# Patient Record
Sex: FEMALE | Race: WHITE | Employment: OTHER | URBAN - METROPOLITAN AREA
[De-identification: names, ages, dates, MRNs, and addresses within clinical notes are randomized per-mention and may not be internally consistent; named-entity substitution may affect disease eponyms.]

---

## 2017-03-27 ENCOUNTER — LAB ENCOUNTER (OUTPATIENT)
Dept: LAB | Age: 47
End: 2017-03-27
Attending: Other
Payer: MEDICARE

## 2017-03-27 DIAGNOSIS — F32.A DEPRESSION: Primary | ICD-10-CM

## 2017-03-27 LAB
ANION GAP SERPL CALC-SCNC: 9 MMOL/L (ref 0–18)
BUN SERPL-MCNC: 4 MG/DL (ref 8–20)
BUN/CREAT SERPL: 5.8 (ref 10–20)
CALCIUM SERPL-MCNC: 9.3 MG/DL (ref 8.5–10.5)
CHLORIDE SERPL-SCNC: 102 MMOL/L (ref 95–110)
CO2 SERPL-SCNC: 26 MMOL/L (ref 22–32)
CREAT SERPL-MCNC: 0.69 MG/DL (ref 0.5–1.5)
GLUCOSE SERPL-MCNC: 98 MG/DL (ref 70–99)
LITHIUM SERPL-SCNC: 0.3 MEQ/L (ref 0.5–1.5)
OSMOLALITY UR CALC.SUM OF ELEC: 281 MOSM/KG (ref 275–295)
POTASSIUM SERPL-SCNC: 3.9 MMOL/L (ref 3.3–5.1)
SODIUM SERPL-SCNC: 137 MMOL/L (ref 136–144)
TSH SERPL-ACNC: 2.89 UIU/ML (ref 0.34–5.6)

## 2017-03-27 PROCEDURE — 84443 ASSAY THYROID STIM HORMONE: CPT

## 2017-03-27 PROCEDURE — 80178 ASSAY OF LITHIUM: CPT

## 2017-03-27 PROCEDURE — 36415 COLL VENOUS BLD VENIPUNCTURE: CPT

## 2017-03-27 PROCEDURE — 80048 BASIC METABOLIC PNL TOTAL CA: CPT

## 2017-08-23 ENCOUNTER — HOSPITAL ENCOUNTER (EMERGENCY)
Facility: HOSPITAL | Age: 47
Discharge: HOME | End: 2017-08-23
Payer: MEDICARE

## 2017-08-23 ENCOUNTER — APPOINTMENT (OUTPATIENT)
Dept: GENERAL RADIOLOGY | Facility: HOSPITAL | Age: 47
End: 2017-08-23
Payer: MEDICARE

## 2017-08-23 VITALS
HEIGHT: 60 IN | WEIGHT: 185 LBS | HEART RATE: 83 BPM | SYSTOLIC BLOOD PRESSURE: 112 MMHG | OXYGEN SATURATION: 97 % | TEMPERATURE: 98 F | RESPIRATION RATE: 19 BRPM | DIASTOLIC BLOOD PRESSURE: 65 MMHG | BODY MASS INDEX: 36.32 KG/M2

## 2017-08-23 DIAGNOSIS — T07.XXXA MULTIPLE CONTUSIONS: Primary | ICD-10-CM

## 2017-08-23 PROCEDURE — 73610 X-RAY EXAM OF ANKLE: CPT

## 2017-08-23 PROCEDURE — 73030 X-RAY EXAM OF SHOULDER: CPT

## 2017-08-23 PROCEDURE — 99284 EMERGENCY DEPT VISIT MOD MDM: CPT

## 2017-08-23 PROCEDURE — 73660 X-RAY EXAM OF TOE(S): CPT

## 2017-08-23 NOTE — ED PROVIDER NOTES
Patient Seen in: [facility] Emergency Department    History   Patient presents with:  Fall (musculoskeletal, neurologic)    Stated Complaint: Fall yesterday, pain in both feet    HPI    Patient is a 70-year-old female who states yesterday she was Cyclobenzaprine HCl (CYCLOBENZAPRINE) 10 MG Oral Tab,         Family History   Problem Relation Age of Onset   • Hypertension Mother    • Prostate Cancer Father    • Uterine Cancer Mother        Smoking status: Current Every Day Smoker tenderness there is good range of motion. Distal circulation sensorimotor functions intact. There is a superficial abrasion to the left knee. There is no bony tenderness there is good range of motion there is no ligamentous laxity.   There is minimal [address]  [address]  [facility] [address]  [phone]    Schedule an appointment as soon as possible for a visit in 2 days        Medications Prescribed:  Current Discharge Medication List

## 2017-08-23 NOTE — ED NOTES
Discharge instructions given to pt. Pt verbalized understanding and denied further questions or concerns. Pt discharged to lobby via wheelchair with mother, discharged in stable condition.

## 2017-08-23 NOTE — ED INITIAL ASSESSMENT (HPI)
C/o left shoulder pain, left ankle pain, and right toe pain s/p fall yesterday. Denies head injury or LOC.

## 2019-01-17 ENCOUNTER — LAB ENCOUNTER (OUTPATIENT)
Dept: LAB | Age: 49
End: 2019-01-17
Attending: Other
Payer: MEDICARE

## 2019-01-17 DIAGNOSIS — Z79.899 ENCOUNTER FOR LONG-TERM (CURRENT) USE OF OTHER MEDICATIONS: Primary | ICD-10-CM

## 2019-01-17 LAB
ANION GAP SERPL CALC-SCNC: 9 MMOL/L (ref 0–18)
BASOPHILS # BLD: 0 K/UL (ref 0–0.2)
BASOPHILS NFR BLD: 1 %
BUN SERPL-MCNC: 6 MG/DL (ref 8–20)
BUN/CREAT SERPL: 6.7 (ref 10–20)
CALCIUM SERPL-MCNC: 9.2 MG/DL (ref 8.5–10.5)
CHLORIDE SERPL-SCNC: 103 MMOL/L (ref 95–110)
CO2 SERPL-SCNC: 24 MMOL/L (ref 22–32)
CREAT SERPL-MCNC: 0.89 MG/DL (ref 0.5–1.5)
EOSINOPHIL # BLD: 0.3 K/UL (ref 0–0.7)
EOSINOPHIL NFR BLD: 4 %
ERYTHROCYTE [DISTWIDTH] IN BLOOD BY AUTOMATED COUNT: 14 % (ref 11–15)
GLUCOSE SERPL-MCNC: 132 MG/DL (ref 70–99)
HCT VFR BLD AUTO: 40.7 % (ref 35–48)
HGB BLD-MCNC: 13.1 G/DL (ref 12–16)
LITHIUM SERPL-SCNC: 0.9 MEQ/L (ref 0.5–1.5)
LYMPHOCYTES # BLD: 1.5 K/UL (ref 1–4)
LYMPHOCYTES NFR BLD: 19 %
MCH RBC QN AUTO: 29 PG (ref 27–32)
MCHC RBC AUTO-ENTMCNC: 32.2 G/DL (ref 32–37)
MCV RBC AUTO: 90.1 FL (ref 80–100)
MONOCYTES # BLD: 0.4 K/UL (ref 0–1)
MONOCYTES NFR BLD: 4 %
NEUTROPHILS # BLD AUTO: 5.8 K/UL (ref 1.8–7.7)
NEUTROPHILS NFR BLD: 72 %
OSMOLALITY UR CALC.SUM OF ELEC: 281 MOSM/KG (ref 275–295)
PLATELET # BLD AUTO: 441 K/UL (ref 140–400)
PMV BLD AUTO: 6.9 FL (ref 7.4–10.3)
POTASSIUM SERPL-SCNC: 3.4 MMOL/L (ref 3.3–5.1)
RBC # BLD AUTO: 4.51 M/UL (ref 3.7–5.4)
SODIUM SERPL-SCNC: 136 MMOL/L (ref 136–144)
TSH SERPL-ACNC: 3.18 UIU/ML (ref 0.45–5.33)
WBC # BLD AUTO: 8.1 K/UL (ref 4–11)

## 2019-01-17 PROCEDURE — 80048 BASIC METABOLIC PNL TOTAL CA: CPT

## 2019-01-17 PROCEDURE — 84443 ASSAY THYROID STIM HORMONE: CPT

## 2019-01-17 PROCEDURE — 80178 ASSAY OF LITHIUM: CPT

## 2019-01-17 PROCEDURE — 85025 COMPLETE CBC W/AUTO DIFF WBC: CPT

## 2019-01-17 PROCEDURE — 36415 COLL VENOUS BLD VENIPUNCTURE: CPT

## 2020-10-08 ENCOUNTER — OFFICE VISIT (OUTPATIENT)
Dept: OBGYN CLINIC | Facility: CLINIC | Age: 50
End: 2020-10-08
Payer: MEDICARE

## 2020-10-08 VITALS
HEART RATE: 99 BPM | SYSTOLIC BLOOD PRESSURE: 118 MMHG | BODY MASS INDEX: 33.77 KG/M2 | DIASTOLIC BLOOD PRESSURE: 76 MMHG | HEIGHT: 60 IN | WEIGHT: 172 LBS

## 2020-10-08 DIAGNOSIS — N95.0 PMB (POSTMENOPAUSAL BLEEDING): ICD-10-CM

## 2020-10-08 DIAGNOSIS — Z12.31 SCREENING MAMMOGRAM, ENCOUNTER FOR: ICD-10-CM

## 2020-10-08 DIAGNOSIS — Z12.4 SCREENING FOR MALIGNANT NEOPLASM OF CERVIX: ICD-10-CM

## 2020-10-08 DIAGNOSIS — Z01.419 WELL WOMAN EXAM: Primary | ICD-10-CM

## 2020-10-08 PROCEDURE — 3074F SYST BP LT 130 MM HG: CPT | Performed by: OBSTETRICS & GYNECOLOGY

## 2020-10-08 PROCEDURE — 3008F BODY MASS INDEX DOCD: CPT | Performed by: OBSTETRICS & GYNECOLOGY

## 2020-10-08 PROCEDURE — 99386 PREV VISIT NEW AGE 40-64: CPT | Performed by: OBSTETRICS & GYNECOLOGY

## 2020-10-08 PROCEDURE — 3078F DIAST BP <80 MM HG: CPT | Performed by: OBSTETRICS & GYNECOLOGY

## 2020-10-08 RX ORDER — DULOXETIN HYDROCHLORIDE 60 MG/1
60 CAPSULE, DELAYED RELEASE ORAL DAILY
COMMUNITY

## 2020-10-08 NOTE — H&P
HPI:  The patient is a 51 yo F here for WWE. Pt states she didn't have menses for 2 years, and then this year, has had 4 episodes of VB for 3 days at a time. Previous GYN Dr. Laxmi Martinez but didn't mention this to him.       LPS: 8/21/15 pap/hpv  Mammo: 10/16/15 drinks      Drug use: No      Sexual activity: Yes    Lifestyle      Physical activity        Days per week: Not on file        Minutes per session: Not on file      Stress: Not on file    Relationships      Social connections        Talks on phone: Not on Transdermal Patch 72 Hr, , Disp: , Rfl: 0  •  Cyclobenzaprine HCl (CYCLOBENZAPRINE) 10 MG Oral Tab, , Disp: , Rfl: 0    ALLERGIES:  No Known Allergies      Review of Systems:  Constitutional:  Denies fevers and chills   Cardiovascular:  denies chest pain o [facility] AMALIA 2D+3D SCREENING BILAT (CPT=77067/09263); Future    PMB (postmenopausal bleeding)  -     US PELVIS W EV (CPT=76856/01417); Future      1. WWE:   1. Reviewed ASCCP guidelines with the patient --pap/hpv today  2.  PMB: plan for TVUS and may need EMB

## 2020-11-09 ENCOUNTER — TELEPHONE (OUTPATIENT)
Dept: OBGYN CLINIC | Facility: CLINIC | Age: 50
End: 2020-11-09

## 2020-11-09 NOTE — TELEPHONE ENCOUNTER
Patient scheduled for pelvic ultrasound at [facility] on 11/10.     Need authorization for codes 76689 & 15017    Nahomi West said they will see it when completed and didn't need call back

## 2020-11-10 ENCOUNTER — HOSPITAL ENCOUNTER (OUTPATIENT)
Dept: MAMMOGRAPHY | Facility: HOSPITAL | Age: 50
Discharge: HOME | End: 2020-11-10
Attending: OBSTETRICS & GYNECOLOGY
Payer: MEDICARE

## 2020-11-10 ENCOUNTER — HOSPITAL ENCOUNTER (OUTPATIENT)
Dept: ULTRASOUND IMAGING | Facility: HOSPITAL | Age: 50
Discharge: HOME | End: 2020-11-10
Attending: OBSTETRICS & GYNECOLOGY
Payer: MEDICARE

## 2020-11-10 DIAGNOSIS — Z12.31 SCREENING MAMMOGRAM, ENCOUNTER FOR: ICD-10-CM

## 2020-11-10 DIAGNOSIS — N95.0 PMB (POSTMENOPAUSAL BLEEDING): ICD-10-CM

## 2020-11-10 PROCEDURE — 77067 SCR MAMMO BI INCL CAD: CPT | Performed by: OBSTETRICS & GYNECOLOGY

## 2020-11-10 PROCEDURE — 77063 BREAST TOMOSYNTHESIS BI: CPT | Performed by: OBSTETRICS & GYNECOLOGY

## 2020-11-10 PROCEDURE — 76830 TRANSVAGINAL US NON-OB: CPT | Performed by: OBSTETRICS & GYNECOLOGY

## 2020-11-10 PROCEDURE — 76856 US EXAM PELVIC COMPLETE: CPT | Performed by: OBSTETRICS & GYNECOLOGY

## 2020-12-18 ENCOUNTER — TELEPHONE (OUTPATIENT)
Dept: OBGYN CLINIC | Facility: CLINIC | Age: 50
End: 2020-12-18

## 2021-01-04 ENCOUNTER — PATIENT MESSAGE (OUTPATIENT)
Dept: OBGYN CLINIC | Facility: CLINIC | Age: 51
End: 2021-01-04

## 2021-01-04 ENCOUNTER — TELEPHONE (OUTPATIENT)
Dept: OBGYN CLINIC | Facility: CLINIC | Age: 51
End: 2021-01-04

## 2021-01-04 DIAGNOSIS — N95.0 PMB (POSTMENOPAUSAL BLEEDING): Primary | ICD-10-CM

## 2021-01-06 NOTE — TELEPHONE ENCOUNTER
Patient calling back and states to have DR. Rosas Speak call this number: [phone] to discuss the US results

## 2021-01-07 NOTE — TELEPHONE ENCOUNTER
Patients mother answered phone. Pt unavailable to speak at this time.   Asked her mother to have pt call me when she can

## 2021-01-07 NOTE — TELEPHONE ENCOUNTER
Patient is returning call and hoping to talk with Dr. Kirstin Hamlin today. She is available from 10am thru the evening. Please advise.

## 2021-01-11 NOTE — TELEPHONE ENCOUNTER
[employer] and spoke to J[address] Who stated stated NO PA needed for CPT 71432.  Call ref# [id number]

## 2021-01-11 NOTE — TELEPHONE ENCOUNTER
Called and spoke with patient. Reviewed thickened endometrium with 1.1cm lesoin in YESICA vs cervix. Reviewed endosee vs D&C. R/B reviewed  Pt wants Endosee with possible emb. Okay to schedule at any point given PMB.       Schedule endosee for this patient:

## 2021-01-12 ENCOUNTER — TELEPHONE (OUTPATIENT)
Dept: OBGYN CLINIC | Facility: CLINIC | Age: 51
End: 2021-01-12

## 2021-01-12 NOTE — TELEPHONE ENCOUNTER
Pt is calling spoke to  yesterday ,she has a tear on uterus .  Pt would like procedure to be done at hospital and have everything done at once ,  She is waiting on nurse to call her ,

## 2021-01-13 NOTE — TELEPHONE ENCOUNTER
Called pt to schedule Endosee. Pt stated that she spoke with [address] [place] [address] and was given 3 options on how to proceed. Pt stated that [address] [place] [address] discussed \"going under\" and pt stated she would prefer to go that route now instead of endosee in office.  Message to [address] [place] [address] for batista

## 2021-01-18 NOTE — TELEPHONE ENCOUNTER
See pt.  Message 01/04 will call pt tomorrow when able to call insurance due to holidays insurance is closed

## 2021-01-18 NOTE — TELEPHONE ENCOUNTER
OB GYN SURGICAL SCHEDULING    Diagnosis: PMB    Operative Procedure:  D&C, Hysteroscopy, Myosure     Side: n/a    Date: okay while on call    Admission:  Day Surgery    Anesthesia:  General     Bowel Prep:  No    Cytotec (200 mcg night prior):   Yes    Rep

## 2021-01-26 NOTE — TELEPHONE ENCOUNTER
Spoke to pt. aware surgery is scheduled on Thursday,02/11/2021 at 3pm with possibility to move up to 1pm. Will call with any changes    Provided her with Cytotec instructions     Called [employer] at [phone] and spoke to Yuli Valladares stat

## 2021-02-04 NOTE — TELEPHONE ENCOUNTER
Called pt and LM advising her surgery has been moved from 3pm to 1pm.    Updated instructions sent     Updated book and calender

## 2021-02-09 ENCOUNTER — LAB ENCOUNTER (OUTPATIENT)
Dept: LAB | Facility: HOSPITAL | Age: 51
End: 2021-02-09
Attending: OBSTETRICS & GYNECOLOGY
Payer: MEDICARE

## 2021-02-09 DIAGNOSIS — Z01.818 PREOP TESTING: ICD-10-CM

## 2021-02-10 LAB — SARS-COV-2 RNA RESP QL NAA+PROBE: NOT DETECTED

## 2021-02-10 RX ORDER — CLONAZEPAM 1 MG/1
1 TABLET, ORALLY DISINTEGRATING ORAL 2 TIMES DAILY PRN
COMMUNITY

## 2021-02-10 RX ORDER — OXYCODONE AND ACETAMINOPHEN 10; 325 MG/1; MG/1
2 TABLET ORAL 2 TIMES DAILY
COMMUNITY

## 2021-02-11 ENCOUNTER — ANESTHESIA (OUTPATIENT)
Dept: SURGERY | Facility: HOSPITAL | Age: 51
End: 2021-02-11
Payer: MEDICARE

## 2021-02-11 ENCOUNTER — HOSPITAL ENCOUNTER (OUTPATIENT)
Facility: HOSPITAL | Age: 51
Setting detail: HOSPITAL OUTPATIENT SURGERY
Discharge: HOME | End: 2021-02-11
Attending: OBSTETRICS & GYNECOLOGY | Admitting: OBSTETRICS & GYNECOLOGY
Payer: MEDICARE

## 2021-02-11 ENCOUNTER — ANESTHESIA EVENT (OUTPATIENT)
Dept: SURGERY | Facility: HOSPITAL | Age: 51
End: 2021-02-11
Payer: MEDICARE

## 2021-02-11 VITALS
RESPIRATION RATE: 16 BRPM | DIASTOLIC BLOOD PRESSURE: 61 MMHG | HEIGHT: 60 IN | OXYGEN SATURATION: 96 % | BODY MASS INDEX: 33.96 KG/M2 | TEMPERATURE: 98 F | HEART RATE: 80 BPM | SYSTOLIC BLOOD PRESSURE: 108 MMHG | WEIGHT: 173 LBS

## 2021-02-11 DIAGNOSIS — N95.0 PMB (POSTMENOPAUSAL BLEEDING): ICD-10-CM

## 2021-02-11 DIAGNOSIS — Z01.818 PREOP TESTING: Primary | ICD-10-CM

## 2021-02-11 LAB — B-HCG UR QL: NEGATIVE

## 2021-02-11 PROCEDURE — 0UDB8ZX EXTRACTION OF ENDOMETRIUM, VIA NATURAL OR ARTIFICIAL OPENING ENDOSCOPIC, DIAGNOSTIC: ICD-10-PCS | Performed by: OBSTETRICS & GYNECOLOGY

## 2021-02-11 PROCEDURE — 58563 HYSTEROSCOPY ABLATION: CPT | Performed by: OBSTETRICS & GYNECOLOGY

## 2021-02-11 RX ORDER — SODIUM CHLORIDE, SODIUM LACTATE, POTASSIUM CHLORIDE, CALCIUM CHLORIDE 600; 310; 30; 20 MG/100ML; MG/100ML; MG/100ML; MG/100ML
INJECTION, SOLUTION INTRAVENOUS CONTINUOUS
Status: DISCONTINUED | OUTPATIENT
Start: 2021-02-11 | End: 2021-02-11

## 2021-02-11 RX ORDER — LITHIUM CARBONATE 300 MG/1
300 CAPSULE ORAL NIGHTLY
COMMUNITY

## 2021-02-11 RX ORDER — HALOPERIDOL 5 MG/ML
0.25 INJECTION INTRAMUSCULAR ONCE AS NEEDED
Status: DISCONTINUED | OUTPATIENT
Start: 2021-02-11 | End: 2021-02-11

## 2021-02-11 RX ORDER — HYDROCODONE BITARTRATE AND ACETAMINOPHEN 5; 325 MG/1; MG/1
1 TABLET ORAL AS NEEDED
Status: DISCONTINUED | OUTPATIENT
Start: 2021-02-11 | End: 2021-02-11

## 2021-02-11 RX ORDER — NALOXONE HYDROCHLORIDE 0.4 MG/ML
80 INJECTION, SOLUTION INTRAMUSCULAR; INTRAVENOUS; SUBCUTANEOUS AS NEEDED
Status: DISCONTINUED | OUTPATIENT
Start: 2021-02-11 | End: 2021-02-11

## 2021-02-11 RX ORDER — LIDOCAINE HYDROCHLORIDE 10 MG/ML
INJECTION, SOLUTION EPIDURAL; INFILTRATION; INTRACAUDAL; PERINEURAL AS NEEDED
Status: DISCONTINUED | OUTPATIENT
Start: 2021-02-11 | End: 2021-02-11 | Stop reason: SURG

## 2021-02-11 RX ORDER — ACETAMINOPHEN 500 MG
1000 TABLET ORAL ONCE
Status: COMPLETED | OUTPATIENT
Start: 2021-02-11 | End: 2021-02-11

## 2021-02-11 RX ORDER — ACETAMINOPHEN 325 MG/1
650 TABLET ORAL EVERY 6 HOURS PRN
Status: DISCONTINUED | OUTPATIENT
Start: 2021-02-11 | End: 2021-02-11

## 2021-02-11 RX ORDER — ONDANSETRON 4 MG/1
4 TABLET, FILM COATED ORAL EVERY 8 HOURS PRN
Status: DISCONTINUED | OUTPATIENT
Start: 2021-02-11 | End: 2021-02-11

## 2021-02-11 RX ORDER — HYDROCODONE BITARTRATE AND ACETAMINOPHEN 5; 325 MG/1; MG/1
2 TABLET ORAL EVERY 6 HOURS PRN
Status: DISCONTINUED | OUTPATIENT
Start: 2021-02-11 | End: 2021-02-11

## 2021-02-11 RX ORDER — ONDANSETRON 2 MG/ML
4 INJECTION INTRAMUSCULAR; INTRAVENOUS EVERY 8 HOURS PRN
Status: DISCONTINUED | OUTPATIENT
Start: 2021-02-11 | End: 2021-02-11

## 2021-02-11 RX ORDER — KETOROLAC TROMETHAMINE 30 MG/ML
30 INJECTION, SOLUTION INTRAMUSCULAR; INTRAVENOUS ONCE
Status: COMPLETED | OUTPATIENT
Start: 2021-02-11 | End: 2021-02-11

## 2021-02-11 RX ORDER — ONDANSETRON 2 MG/ML
INJECTION INTRAMUSCULAR; INTRAVENOUS AS NEEDED
Status: DISCONTINUED | OUTPATIENT
Start: 2021-02-11 | End: 2021-02-11 | Stop reason: SURG

## 2021-02-11 RX ORDER — HYDROCODONE BITARTRATE AND ACETAMINOPHEN 5; 325 MG/1; MG/1
1 TABLET ORAL EVERY 6 HOURS PRN
Status: DISCONTINUED | OUTPATIENT
Start: 2021-02-11 | End: 2021-02-11

## 2021-02-11 RX ORDER — MORPHINE SULFATE 4 MG/ML
2 INJECTION, SOLUTION INTRAMUSCULAR; INTRAVENOUS EVERY 10 MIN PRN
Status: DISCONTINUED | OUTPATIENT
Start: 2021-02-11 | End: 2021-02-11

## 2021-02-11 RX ORDER — MIDAZOLAM HYDROCHLORIDE 1 MG/ML
INJECTION INTRAMUSCULAR; INTRAVENOUS AS NEEDED
Status: DISCONTINUED | OUTPATIENT
Start: 2021-02-11 | End: 2021-02-11 | Stop reason: SURG

## 2021-02-11 RX ORDER — HYDROCODONE BITARTRATE AND ACETAMINOPHEN 5; 325 MG/1; MG/1
2 TABLET ORAL AS NEEDED
Status: DISCONTINUED | OUTPATIENT
Start: 2021-02-11 | End: 2021-02-11

## 2021-02-11 RX ORDER — HYDROMORPHONE HYDROCHLORIDE 1 MG/ML
0.4 INJECTION, SOLUTION INTRAMUSCULAR; INTRAVENOUS; SUBCUTANEOUS EVERY 5 MIN PRN
Status: DISCONTINUED | OUTPATIENT
Start: 2021-02-11 | End: 2021-02-11

## 2021-02-11 RX ORDER — ONDANSETRON 2 MG/ML
4 INJECTION INTRAMUSCULAR; INTRAVENOUS ONCE AS NEEDED
Status: DISCONTINUED | OUTPATIENT
Start: 2021-02-11 | End: 2021-02-11

## 2021-02-11 RX ORDER — HYDROMORPHONE HYDROCHLORIDE 1 MG/ML
0.2 INJECTION, SOLUTION INTRAMUSCULAR; INTRAVENOUS; SUBCUTANEOUS EVERY 5 MIN PRN
Status: DISCONTINUED | OUTPATIENT
Start: 2021-02-11 | End: 2021-02-11

## 2021-02-11 RX ORDER — MORPHINE SULFATE 4 MG/ML
4 INJECTION, SOLUTION INTRAMUSCULAR; INTRAVENOUS EVERY 10 MIN PRN
Status: DISCONTINUED | OUTPATIENT
Start: 2021-02-11 | End: 2021-02-11

## 2021-02-11 RX ORDER — HYDROMORPHONE HYDROCHLORIDE 1 MG/ML
0.6 INJECTION, SOLUTION INTRAMUSCULAR; INTRAVENOUS; SUBCUTANEOUS EVERY 5 MIN PRN
Status: DISCONTINUED | OUTPATIENT
Start: 2021-02-11 | End: 2021-02-11

## 2021-02-11 RX ORDER — PROCHLORPERAZINE EDISYLATE 5 MG/ML
5 INJECTION INTRAMUSCULAR; INTRAVENOUS ONCE AS NEEDED
Status: DISCONTINUED | OUTPATIENT
Start: 2021-02-11 | End: 2021-02-11

## 2021-02-11 RX ORDER — MORPHINE SULFATE 10 MG/ML
6 INJECTION, SOLUTION INTRAMUSCULAR; INTRAVENOUS EVERY 10 MIN PRN
Status: DISCONTINUED | OUTPATIENT
Start: 2021-02-11 | End: 2021-02-11

## 2021-02-11 RX ORDER — DEXAMETHASONE SODIUM PHOSPHATE 4 MG/ML
VIAL (ML) INJECTION AS NEEDED
Status: DISCONTINUED | OUTPATIENT
Start: 2021-02-11 | End: 2021-02-11 | Stop reason: SURG

## 2021-02-11 RX ADMIN — LIDOCAINE HYDROCHLORIDE 50 MG: 10 INJECTION, SOLUTION EPIDURAL; INFILTRATION; INTRACAUDAL; PERINEURAL at 13:51:00

## 2021-02-11 RX ADMIN — SODIUM CHLORIDE, SODIUM LACTATE, POTASSIUM CHLORIDE, CALCIUM CHLORIDE: 600; 310; 30; 20 INJECTION, SOLUTION INTRAVENOUS at 13:51:00

## 2021-02-11 RX ADMIN — MIDAZOLAM HYDROCHLORIDE 2 MG: 1 INJECTION INTRAMUSCULAR; INTRAVENOUS at 13:51:00

## 2021-02-11 RX ADMIN — ONDANSETRON 4 MG: 2 INJECTION INTRAMUSCULAR; INTRAVENOUS at 14:01:00

## 2021-02-11 RX ADMIN — DEXAMETHASONE SODIUM PHOSPHATE 4 MG: 4 MG/ML VIAL (ML) INJECTION at 13:53:00

## 2021-02-11 NOTE — OPERATIVE REPORT
.Operative Note    Patient Name: Nura Deluca    Preoperative Diagnosis: PMB (postmenopausal bleeding) [N95.0]    Postoperative Diagnosis: PMB (postmenopausal bleeding) [N95.0]    Surgeon(s) and Role:     Wendi Cummings DO - Daxa Weaver then removed from the uterus, cervix and vagina. The tenaculum sites were irritated and silver nitrate was applied with hemostasis achieved. The patient tolerated the procedure well.   She was transferred recovery room after awakening from general anesthe

## 2021-02-11 NOTE — ANESTHESIA POSTPROCEDURE EVALUATION
Patient: Ji Johnson    Procedure Summary     Date: 02/11/21 Room / Location: [facility] OR  / [facility] OR    Anesthesia Start: 7708 Anesthesia Stop: 5661    Procedure: MYOMECTOMY HYSTEROSCOPIC (N/A Vagina ) Diagnosis:       PMB (postmenopausal ble

## 2021-02-11 NOTE — ANESTHESIA PROCEDURE NOTES
Airway  Date/Time: 2/11/2021 1:55 PM  Urgency: Elective    Airway not difficult    General Information and Staff    Patient location during procedure: OR  Anesthesiologist: Drew Brantley MD  Resident/CRNA: Ronny Stovall CRNA  Performed: CRNA     In

## 2021-02-11 NOTE — ANESTHESIA PREPROCEDURE EVALUATION
Anesthesia PreOp Note    HPI:     Meredith Hernandez is a 48year old female who presents for preoperative consultation requested by: Rudi Dunne DO    Date of Surgery: 2/11/2021    Procedure(s):   MYOMECTOMY HYSTEROSCOPIC  Indication: PMB (postmen Take 40 mg by mouth nightly.  , Disp: , Rfl: 0, 2/10/2021 at 2330    •  morphINE Sulfate ER (MS CONTIN) 60 MG Oral Tab CR, , Disp: , Rfl: 0, 2/11/2021 at 0900    •  furosemide (LASIX) 40 MG Oral Tab, Take 40 mg by mouth daily.   , Disp: , Rfl: 1, 2/10/2021 file    Relationships      Social connections        Talks on phone: Not on file        Gets together: Not on file        Attends Jehovah's witness service: Not on file        Active member of club or organization: Not on file        Attends meetings of clubs or o good    ROS comment: Hyperlipidemia      Neuro/Psych    (+)  neuromuscular disease (fibromyalgia, brain aneurysm s/p coiling), anxiety/panic attacks,  bipolar disorder, depression,       GI/Hepatic/Renal - negative ROS     Endo/Other - negative ROS   Abdom

## 2021-02-11 NOTE — H&P
[address]. Patient Status:  [facility] Outpatient Surgery    1970 MRN [id number]   Location [facility] Attending Meghan Almanza DO   Hosp Day # 0 KAREEM Santizo =====  CONCLUSION:   1. Anteverted uterus is normal in size without fibroids.   The endometrium is mildly thickened favoring endometrial hyperplasia over endometrial carcinoma; endometrial biopsy could be considered given the history of postmenopausal ble History: Social History    Tobacco Use      Smoking status: Current Every Day Smoker        Packs/day: 0.50        Years: 28.00        Pack years: 14        Types: Cigarettes      Smokeless tobacco: Never Used    Alcohol use: No      Alcohol/week: 0.0 irvin see, SCDs.       Nikko [facility] CenterDO nisreen  2/11/2021  12:20 PM

## 2021-02-26 ENCOUNTER — OFFICE VISIT (OUTPATIENT)
Dept: OBGYN CLINIC | Facility: CLINIC | Age: 51
End: 2021-02-26
Payer: MEDICARE

## 2021-02-26 VITALS
SYSTOLIC BLOOD PRESSURE: 143 MMHG | WEIGHT: 175 LBS | DIASTOLIC BLOOD PRESSURE: 72 MMHG | HEART RATE: 116 BPM | BODY MASS INDEX: 34 KG/M2

## 2021-02-26 DIAGNOSIS — N95.0 PMB (POSTMENOPAUSAL BLEEDING): ICD-10-CM

## 2021-02-26 DIAGNOSIS — Z98.890 POST-OPERATIVE STATE: Primary | ICD-10-CM

## 2021-02-26 PROCEDURE — 3078F DIAST BP <80 MM HG: CPT | Performed by: OBSTETRICS & GYNECOLOGY

## 2021-02-26 PROCEDURE — 99213 OFFICE O/P EST LOW 20 MIN: CPT | Performed by: OBSTETRICS & GYNECOLOGY

## 2021-02-26 PROCEDURE — 3077F SYST BP >= 140 MM HG: CPT | Performed by: OBSTETRICS & GYNECOLOGY

## 2021-02-26 RX ORDER — ROPINIROLE 0.25 MG/1
TABLET, FILM COATED ORAL
COMMUNITY
Start: 2021-02-19

## 2021-02-26 NOTE — H&P
HPI:  The patient is a 47 yo F s/p D&C/HSC/Myosure for PMB. Path benign. Doing well w/o complaints.       Results for orders placed or performed during the hospital encounter of 02/11/21   SURGICAL PATHOLOGY TISSUE   Result Value Ref Range    Case Report Anxiety    • B[facility] ([facility]    • Birth control     \"birth control tabs\"   • Brain aneurysm    • Depression    • Fibromyalgia     chronic pain   • Hip fracture (HCC)     femur pinning, pelvis   • Hyperlipidemia    • Manic behavior (HCC)    • Mul organizations: Not on file        Relationship status: Not on file      Intimate partner violence        Fear of current or ex partner: Not on file        Emotionally abused: Not on file        Physically abused: Not on file        Forced sexual activity: 10 MG Oral Tab, Take 10 mg by mouth nightly.  , Disp: , Rfl: 0    ALLERGIES:  No Known Allergies      Review of Systems:  Constitutional:  Denies fevers and chills   Cardiovascular:  denies chest pain or palpitations  Respiratory:  denies shortness of yenny

## 2021-04-23 ENCOUNTER — IMMUNIZATION (OUTPATIENT)
Dept: LAB | Facility: HOSPITAL | Age: 51
End: 2021-04-23
Attending: HOSPITALIST
Payer: MEDICARE

## 2021-04-23 DIAGNOSIS — Z23 NEED FOR VACCINATION: Primary | ICD-10-CM

## 2021-04-23 PROCEDURE — 0011A SARSCOV2 VAC 100MCG/0.5ML IM: CPT

## 2021-05-21 ENCOUNTER — IMMUNIZATION (OUTPATIENT)
Dept: LAB | Facility: HOSPITAL | Age: 51
End: 2021-05-21
Attending: EMERGENCY MEDICINE
Payer: MEDICARE

## 2021-05-21 DIAGNOSIS — Z23 NEED FOR VACCINATION: Primary | ICD-10-CM

## 2021-05-21 PROCEDURE — 0012A SARSCOV2 VAC 100MCG/0.5ML IM: CPT

## 2022-07-18 ENCOUNTER — HOSPITAL ENCOUNTER (EMERGENCY)
Facility: HOSPITAL | Age: 52
Discharge: LEFT BEFORE DISCHARGE | End: 2022-07-18

## 2022-07-18 VITALS
SYSTOLIC BLOOD PRESSURE: 135 MMHG | RESPIRATION RATE: 18 BRPM | TEMPERATURE: 98 F | HEART RATE: 94 BPM | OXYGEN SATURATION: 98 % | DIASTOLIC BLOOD PRESSURE: 86 MMHG

## 2022-07-25 ENCOUNTER — HOSPITAL ENCOUNTER (OUTPATIENT)
Age: 52
Discharge: HOME | End: 2022-07-25
Payer: MEDICARE

## 2022-07-25 ENCOUNTER — APPOINTMENT (OUTPATIENT)
Dept: GENERAL RADIOLOGY | Age: 52
End: 2022-07-25
Attending: NURSE PRACTITIONER
Payer: MEDICARE

## 2022-07-25 VITALS
HEART RATE: 89 BPM | RESPIRATION RATE: 17 BRPM | SYSTOLIC BLOOD PRESSURE: 133 MMHG | TEMPERATURE: 98 F | OXYGEN SATURATION: 96 % | DIASTOLIC BLOOD PRESSURE: 71 MMHG

## 2022-07-25 DIAGNOSIS — M25.551 BILATERAL HIP PAIN: ICD-10-CM

## 2022-07-25 DIAGNOSIS — M25.552 BILATERAL HIP PAIN: ICD-10-CM

## 2022-07-25 DIAGNOSIS — M25.571 ACUTE RIGHT ANKLE PAIN: Primary | ICD-10-CM

## 2022-07-25 PROCEDURE — 73610 X-RAY EXAM OF ANKLE: CPT | Performed by: NURSE PRACTITIONER

## 2022-07-25 PROCEDURE — 73523 X-RAY EXAM HIPS BI 5/> VIEWS: CPT | Performed by: NURSE PRACTITIONER

## 2022-07-25 PROCEDURE — 99204 OFFICE O/P NEW MOD 45 MIN: CPT | Performed by: NURSE PRACTITIONER

## 2022-07-25 PROCEDURE — 73522 X-RAY EXAM HIPS BI 3-4 VIEWS: CPT | Performed by: NURSE PRACTITIONER

## 2022-07-25 NOTE — ED INITIAL ASSESSMENT (HPI)
Patient reports having right ankle pain that radiates up into the right lower leg x 2.5 months. Verbalized complaints of bilateral hip pain x 2.5 months. Reports worsening of pain approx one week ago, denies use of any pain medication.

## 2022-09-18 ENCOUNTER — APPOINTMENT (OUTPATIENT)
Dept: CT IMAGING | Facility: HOSPITAL | Age: 52
End: 2022-09-18
Attending: EMERGENCY MEDICINE
Payer: MEDICARE

## 2022-09-18 ENCOUNTER — HOSPITAL ENCOUNTER (INPATIENT)
Facility: HOSPITAL | Age: 52
LOS: 5 days | Discharge: HOME | End: 2022-09-23
Attending: EMERGENCY MEDICINE | Admitting: EMERGENCY MEDICINE
Payer: MEDICARE

## 2022-09-18 ENCOUNTER — HOSPITAL ENCOUNTER (INPATIENT)
Facility: HOSPITAL | Age: 52
LOS: 5 days | Discharge: HOME | End: 2022-09-23
Attending: EMERGENCY MEDICINE
Payer: MEDICARE

## 2022-09-18 ENCOUNTER — APPOINTMENT (OUTPATIENT)
Dept: GENERAL RADIOLOGY | Facility: HOSPITAL | Age: 52
End: 2022-09-18
Attending: EMERGENCY MEDICINE
Payer: MEDICARE

## 2022-09-18 DIAGNOSIS — N28.0 RENAL INFARCTION (HCC): Primary | ICD-10-CM

## 2022-09-18 DIAGNOSIS — S20.229A CONTUSION OF BACK, UNSPECIFIED LATERALITY, INITIAL ENCOUNTER: ICD-10-CM

## 2022-09-18 DIAGNOSIS — N28.9 RENAL INSUFFICIENCY: ICD-10-CM

## 2022-09-18 DIAGNOSIS — E87.6 HYPOKALEMIA: ICD-10-CM

## 2022-09-18 DIAGNOSIS — N12 PYELONEPHRITIS: ICD-10-CM

## 2022-09-18 PROBLEM — N17.9 AKI (ACUTE KIDNEY INJURY) (HCC): Status: ACTIVE | Noted: 2022-09-18

## 2022-09-18 PROBLEM — N17.9 AKI (ACUTE KIDNEY INJURY): Status: ACTIVE | Noted: 2022-09-18

## 2022-09-18 LAB
ALBUMIN SERPL-MCNC: 2.5 G/DL (ref 3.4–5)
ALP LIVER SERPL-CCNC: 136 U/L
ALT SERPL-CCNC: 20 U/L
ANION GAP SERPL CALC-SCNC: 8 MMOL/L (ref 0–18)
AST SERPL-CCNC: 21 U/L (ref 15–37)
BASOPHILS # BLD AUTO: 0.09 X10(3) UL (ref 0–0.2)
BASOPHILS NFR BLD AUTO: 0.5 %
BILIRUB DIRECT SERPL-MCNC: 0.3 MG/DL (ref 0–0.2)
BILIRUB SERPL-MCNC: 0.8 MG/DL (ref 0.1–2)
BILIRUB UR QL: NEGATIVE
BUN BLD-MCNC: 15 MG/DL (ref 7–18)
BUN/CREAT SERPL: 10.6 (ref 10–20)
CALCIUM BLD-MCNC: 9.8 MG/DL (ref 8.5–10.1)
CHLORIDE SERPL-SCNC: 94 MMOL/L (ref 98–112)
CO2 SERPL-SCNC: 29 MMOL/L (ref 21–32)
COLOR UR: YELLOW
CREAT BLD-MCNC: 1.42 MG/DL
DEPRECATED RDW RBC AUTO: 46.2 FL (ref 35.1–46.3)
EOSINOPHIL # BLD AUTO: 0.01 X10(3) UL (ref 0–0.7)
EOSINOPHIL NFR BLD AUTO: 0.1 %
ERYTHROCYTE [DISTWIDTH] IN BLOOD BY AUTOMATED COUNT: 13.7 % (ref 11–15)
GFR SERPLBLD BASED ON 1.73 SQ M-ARVRAT: 45 ML/MIN/1.73M2 (ref 60–?)
GLUCOSE BLD-MCNC: 128 MG/DL (ref 70–99)
GLUCOSE UR-MCNC: NEGATIVE MG/DL
HCT VFR BLD AUTO: 41.1 %
HGB BLD-MCNC: 13.2 G/DL
IMM GRANULOCYTES # BLD AUTO: 0.14 X10(3) UL (ref 0–1)
IMM GRANULOCYTES NFR BLD: 0.8 %
KETONES UR-MCNC: NEGATIVE MG/DL
LACTATE SERPL-SCNC: 1.3 MMOL/L (ref 0.4–2)
LIPASE SERPL-CCNC: 52 U/L (ref 73–393)
LYMPHOCYTES # BLD AUTO: 1.2 X10(3) UL (ref 1–4)
LYMPHOCYTES NFR BLD AUTO: 6.5 %
MCH RBC QN AUTO: 29.1 PG (ref 26–34)
MCHC RBC AUTO-ENTMCNC: 32.1 G/DL (ref 31–37)
MCV RBC AUTO: 90.5 FL
MONOCYTES # BLD AUTO: 1.02 X10(3) UL (ref 0.1–1)
MONOCYTES NFR BLD AUTO: 5.5 %
NEUTROPHILS # BLD AUTO: 15.92 X10 (3) UL (ref 1.5–7.7)
NEUTROPHILS # BLD AUTO: 15.92 X10(3) UL (ref 1.5–7.7)
NEUTROPHILS NFR BLD AUTO: 86.6 %
NITRITE UR QL STRIP.AUTO: NEGATIVE
OSMOLALITY SERPL CALC.SUM OF ELEC: 274 MOSM/KG (ref 275–295)
PH UR: 7 [PH] (ref 5–8)
PLATELET # BLD AUTO: 244 10(3)UL (ref 150–450)
POTASSIUM SERPL-SCNC: 3.2 MMOL/L (ref 3.5–5.1)
PROT SERPL-MCNC: 6.9 G/DL (ref 6.4–8.2)
PROT UR-MCNC: >=300 MG/DL
RBC # BLD AUTO: 4.54 X10(6)UL
SARS-COV-2 RNA RESP QL NAA+PROBE: NOT DETECTED
SODIUM SERPL-SCNC: 131 MMOL/L (ref 136–145)
SP GR UR STRIP: 1.01 (ref 1–1.03)
UROBILINOGEN UR STRIP-ACNC: 0.2
WBC # BLD AUTO: 18.4 X10(3) UL (ref 4–11)
WBC #/AREA URNS AUTO: >50 /HPF
WBC #/AREA URNS AUTO: >50 /HPF

## 2022-09-18 PROCEDURE — 71045 X-RAY EXAM CHEST 1 VIEW: CPT | Performed by: EMERGENCY MEDICINE

## 2022-09-18 PROCEDURE — 99223 1ST HOSP IP/OBS HIGH 75: CPT | Performed by: INTERNAL MEDICINE

## 2022-09-18 PROCEDURE — 74177 CT ABD & PELVIS W/CONTRAST: CPT | Performed by: EMERGENCY MEDICINE

## 2022-09-18 RX ORDER — ATORVASTATIN CALCIUM 10 MG/1
10 TABLET, FILM COATED ORAL NIGHTLY
Refills: 0 | Status: DISCONTINUED | OUTPATIENT
Start: 2022-09-18 | End: 2022-09-23

## 2022-09-18 RX ORDER — HYDROCODONE BITARTRATE AND ACETAMINOPHEN 5; 325 MG/1; MG/1
1 TABLET ORAL EVERY 4 HOURS PRN
Status: DISCONTINUED | OUTPATIENT
Start: 2022-09-18 | End: 2022-09-23

## 2022-09-18 RX ORDER — CLONAZEPAM 1 MG/1
1 TABLET ORAL 2 TIMES DAILY PRN
Status: DISCONTINUED | OUTPATIENT
Start: 2022-09-18 | End: 2022-09-23

## 2022-09-18 RX ORDER — LITHIUM CARBONATE 300 MG/1
300 CAPSULE ORAL NIGHTLY
Status: DISCONTINUED | OUTPATIENT
Start: 2022-09-18 | End: 2022-09-23

## 2022-09-18 RX ORDER — SODIUM CHLORIDE 9 MG/ML
INJECTION, SOLUTION INTRAVENOUS CONTINUOUS
Status: DISCONTINUED | OUTPATIENT
Start: 2022-09-18 | End: 2022-09-23

## 2022-09-18 RX ORDER — ROPINIROLE 0.25 MG/1
0.25 TABLET, FILM COATED ORAL NIGHTLY
Status: DISCONTINUED | OUTPATIENT
Start: 2022-09-18 | End: 2022-09-23

## 2022-09-18 RX ORDER — DULOXETIN HYDROCHLORIDE 60 MG/1
60 CAPSULE, DELAYED RELEASE ORAL DAILY
Status: DISCONTINUED | OUTPATIENT
Start: 2022-09-18 | End: 2022-09-23

## 2022-09-18 RX ORDER — ONDANSETRON 2 MG/ML
4 INJECTION INTRAMUSCULAR; INTRAVENOUS ONCE
Status: COMPLETED | OUTPATIENT
Start: 2022-09-18 | End: 2022-09-18

## 2022-09-18 RX ORDER — ACETAMINOPHEN 500 MG
1000 TABLET ORAL ONCE
Status: COMPLETED | OUTPATIENT
Start: 2022-09-18 | End: 2022-09-18

## 2022-09-18 RX ORDER — OXYCODONE AND ACETAMINOPHEN 10; 325 MG/1; MG/1
2 TABLET ORAL EVERY 6 HOURS PRN
Status: DISCONTINUED | OUTPATIENT
Start: 2022-09-18 | End: 2022-09-23

## 2022-09-18 RX ORDER — ONDANSETRON 2 MG/ML
4 INJECTION INTRAMUSCULAR; INTRAVENOUS EVERY 6 HOURS PRN
Status: DISCONTINUED | OUTPATIENT
Start: 2022-09-18 | End: 2022-09-23

## 2022-09-18 RX ORDER — ACETAMINOPHEN 325 MG/1
650 TABLET ORAL EVERY 4 HOURS PRN
Status: DISCONTINUED | OUTPATIENT
Start: 2022-09-18 | End: 2022-09-23

## 2022-09-18 RX ORDER — HEPARIN SODIUM 5000 [USP'U]/ML
5000 INJECTION, SOLUTION INTRAVENOUS; SUBCUTANEOUS EVERY 8 HOURS SCHEDULED
Status: DISCONTINUED | OUTPATIENT
Start: 2022-09-18 | End: 2022-09-23

## 2022-09-18 RX ORDER — HYDROCODONE BITARTRATE AND ACETAMINOPHEN 5; 325 MG/1; MG/1
2 TABLET ORAL EVERY 4 HOURS PRN
Status: DISCONTINUED | OUTPATIENT
Start: 2022-09-18 | End: 2022-09-23

## 2022-09-18 RX ORDER — ACETAMINOPHEN 500 MG
1000 TABLET ORAL ONCE
Status: DISCONTINUED | OUTPATIENT
Start: 2022-09-18 | End: 2022-09-18

## 2022-09-18 RX ORDER — ACETAMINOPHEN 500 MG
500 TABLET ORAL EVERY 4 HOURS PRN
Status: DISCONTINUED | OUTPATIENT
Start: 2022-09-18 | End: 2022-09-23

## 2022-09-18 RX ORDER — FENTANYL 100 UG/H
1 PATCH TRANSDERMAL
Status: DISCONTINUED | OUTPATIENT
Start: 2022-09-19 | End: 2022-09-23

## 2022-09-18 NOTE — PLAN OF CARE
Problem: Patient Centered Care  Goal: Patient preferences are identified and integrated in the patient's plan of care  Description: Interventions:  - What would you like us to know as we care for you?   - Provide timely, complete, and accurate information to patient/family  - Incorporate patient and family knowledge, values, beliefs, and cultural backgrounds into the planning and delivery of care  - Encourage patient/family to participate in care and decision-making at the level they choose  - Honor patient and family perspectives and choices  Outcome: Progressing     Problem: Patient/Family Goals  Goal: Patient/Family Long Term Goal  Description: Patient's Long Term Goal:     Interventions:  -   - See additional Care Plan goals for specific interventions  Outcome: Progressing  Goal: Patient/Family Short Term Goal  Description: Patient's Short Term Goal    Interventions:   -   - See additional Care Plan goals for specific interventions  Outcome: Progressing       Patient admitted from ED, patient appeared to be lethargic but easy to arouse. Speech was nonsensical at times.  Patient appears comfortable and sleeping, no PRNs given at this time

## 2022-09-18 NOTE — ED INITIAL ASSESSMENT (HPI)
Pt arrive in ER per [facility] ambulance with c/o fall 4 days ago + pain to left side of her upper back and to her buttock area, pt sts that she has been throwing up all night, pt has a tem of 100.1 upon arrival

## 2022-09-18 NOTE — ED QUICK NOTES
Orders for admission, patient is aware of plan and ready to go upstairs.  Any questions, please call ED RN Hoa Bolanos at extension [phone]    Patient Covid vaccination status: Fully vaccinated     COVID Test Ordered in ED: Rapid SARS-CoV-2 by PCR    COVID Suspicion at Admission: Low clinical suspicion for COVID    Running Infusions:      Mental Status/LOC at time of transport: aox3    Other pertinent information: Pt from home with , normally on room air, started oxygen in ER, independent with care  CIWA score: N/A   NIH score:  N/A

## 2022-09-19 ENCOUNTER — APPOINTMENT (OUTPATIENT)
Dept: CT IMAGING | Facility: HOSPITAL | Age: 52
End: 2022-09-19
Attending: HOSPITALIST
Payer: MEDICARE

## 2022-09-19 LAB
ANION GAP SERPL CALC-SCNC: 6 MMOL/L (ref 0–18)
BASOPHILS # BLD: 0.16 X10(3) UL (ref 0–0.2)
BASOPHILS NFR BLD: 1 %
BUN BLD-MCNC: 18 MG/DL (ref 7–18)
BUN/CREAT SERPL: 11.3 (ref 10–20)
CALCIUM BLD-MCNC: 7.9 MG/DL (ref 8.5–10.1)
CHLORIDE SERPL-SCNC: 107 MMOL/L (ref 98–112)
CO2 SERPL-SCNC: 24 MMOL/L (ref 21–32)
CREAT BLD-MCNC: 1.59 MG/DL
DEPRECATED RDW RBC AUTO: 49.1 FL (ref 35.1–46.3)
EOSINOPHIL # BLD: 0 X10(3) UL (ref 0–0.7)
EOSINOPHIL NFR BLD: 0 %
ERYTHROCYTE [DISTWIDTH] IN BLOOD BY AUTOMATED COUNT: 14.2 % (ref 11–15)
GFR SERPLBLD BASED ON 1.73 SQ M-ARVRAT: 39 ML/MIN/1.73M2 (ref 60–?)
GLUCOSE BLD-MCNC: 134 MG/DL (ref 70–99)
HCT VFR BLD AUTO: 33.6 %
HGB BLD-MCNC: 10.5 G/DL
LYMPHOCYTES NFR BLD: 0.32 X10(3) UL (ref 1–4)
LYMPHOCYTES NFR BLD: 2 %
MAGNESIUM SERPL-MCNC: 2.2 MG/DL (ref 1.6–2.6)
MCH RBC QN AUTO: 29.3 PG (ref 26–34)
MCHC RBC AUTO-ENTMCNC: 31.3 G/DL (ref 31–37)
MCV RBC AUTO: 93.9 FL
MONOCYTES # BLD: 1.3 X10(3) UL (ref 0.1–1)
MONOCYTES NFR BLD: 8 %
MORPHOLOGY: NORMAL
NEUTROPHILS # BLD AUTO: 13.95 X10 (3) UL (ref 1.5–7.7)
NEUTROPHILS NFR BLD: 81 %
NEUTS BAND NFR BLD: 8 %
NEUTS HYPERSEG # BLD: 14.42 X10(3) UL (ref 1.5–7.7)
OSMOLALITY SERPL CALC.SUM OF ELEC: 288 MOSM/KG (ref 275–295)
PLATELET # BLD AUTO: 192 10(3)UL (ref 150–450)
PLATELET MORPHOLOGY: NORMAL
POTASSIUM SERPL-SCNC: 3.5 MMOL/L (ref 3.5–5.1)
POTASSIUM SERPL-SCNC: 3.5 MMOL/L (ref 3.5–5.1)
RBC # BLD AUTO: 3.58 X10(6)UL
SODIUM SERPL-SCNC: 137 MMOL/L (ref 136–145)
TOTAL CELLS COUNTED BLD: 100
WBC # BLD AUTO: 16.2 X10(3) UL (ref 4–11)

## 2022-09-19 PROCEDURE — 70450 CT HEAD/BRAIN W/O DYE: CPT | Performed by: HOSPITALIST

## 2022-09-19 PROCEDURE — 99233 SBSQ HOSP IP/OBS HIGH 50: CPT | Performed by: INTERNAL MEDICINE

## 2022-09-19 NOTE — OCCUPATIONAL THERAPY NOTE
Attempted OT evaluation. Per , pt just fell asleep and for therapists to re-attempt later. RN notified.     Corrina [facility]   Occupational Therapist  [facility]

## 2022-09-19 NOTE — PLAN OF CARE
Unsteady gait, poor safety awareness, poor appetite, no c/o pain. Problem: Patient Centered Care  Goal: Patient preferences are identified and integrated in the patient's plan of care  Description: Interventions:  - What would you like us to know as we care for you? From home with   - Provide timely, complete, and accurate information to patient/family  - Incorporate patient and family knowledge, values, beliefs, and cultural backgrounds into the planning and delivery of care  - Encourage patient/family to participate in care and decision-making at the level they choose  - Honor patient and family perspectives and choices  Outcome: Progressing     Problem: Patient/Family Goals  Goal: Patient/Family Long Term Goal  Description: Patient's Long Term Goal: to be released    Interventions:  - antibiotics, therapy evaluation  - See additional Care Plan goals for specific interventions  Outcome: Progressing     Problem: RESPIRATORY - ADULT  Goal: Achieves optimal ventilation and oxygenation  Description: INTERVENTIONS:  - Assess for changes in respiratory status  - Assess for changes in mentation and behavior  - Position to facilitate oxygenation and minimize respiratory effort  - Oxygen supplementation based on oxygen saturation or ABGs  - Provide Smoking Cessation handout, if applicable  - Encourage broncho-pulmonary hygiene including cough, deep breathe, Incentive Spirometry  - Assess the need for suctioning and perform as needed  - Assess and instruct to report SOB or any respiratory difficulty  - Respiratory Therapy support as indicated  - Manage/alleviate anxiety  - Monitor for signs/symptoms of CO2 retention  Outcome: Progressing  Note: Desats while sleeping.      Problem: SKIN/TISSUE INTEGRITY - ADULT  Goal: Skin integrity remains intact  Description: INTERVENTIONS  - Assess and document risk factors for pressure ulcer development  - Assess and document skin integrity  - Monitor for areas of redness and/or skin breakdown  - Initiate interventions, skin care algorithm/standards of care as needed  Outcome: Progressing     Problem: Patient/Family Goals  Goal: Patient/Family Short Term Goal  Description: Patient's Short Term Goal: improve balance    Interventions:   - therapy evaluation, assistive devices. - See additional Care Plan goals for specific interventions  Outcome: Not Progressing  Note: Spouse declined OT therapy evaluation this am, wanted his wife to sleep, said she hadn't slept in 72 hours.      Problem: MUSCULOSKELETAL - ADULT  Goal: Return mobility to safest level of function  Description: INTERVENTIONS:  - Assess patient stability and activity tolerance for standing, transferring and ambulating w/ or w/o assistive devices  - Assist with transfers and ambulation using safe patient handling equipment as needed  - Ensure adequate protection for wounds/incisions during mobilization  - Obtain PT/OT consults as needed  - Advance activity as appropriate  - Communicate ordered activity level and limitations with patient/family  Outcome: Not Progressing     Problem: NEUROLOGICAL - ADULT  Goal: Achieves maximal functionality and self care  Description: INTERVENTIONS  - Monitor swallowing and airway patency with patient fatigue and changes in neurological status  - Encourage and assist patient to increase activity and self care with guidance from PT/OT  - Encourage visually impaired, hearing impaired and aphasic patients to use assistive/communication devices  Outcome: Not Progressing     Problem: Impaired Functional Mobility  Goal: Achieve highest/safest level of mobility/gait  Description: Interventions:  - Assess patient's functional ability and stability  - Promote increasing activity/tolerance for mobility and gait  - Educate and engage patient/family in tolerated activity level and precautions  - Recommend use of  RW for transfers and ambulation  Outcome: Not Progressing     Problem: Impaired Activities of Daily Living  Goal: Achieve highest/safest level of independence in self care  Description: Interventions:  - Assess ability and encourage patient to participate in ADLs to maximize function  - Promote sitting position while performing ADLs such as feeding, grooming, and bathing  - Educate and encourage patient/family in tolerated functional activity level and precautions during self-care  - Encourage patient to incorporate impaired side during daily activities to promote function  Outcome: Not Progressing

## 2022-09-19 NOTE — PHYSICAL THERAPY NOTE
PT orders recd, chart reviewed. Attempted to see pt for PT evaluation. Upon entry to pt room, pt asleep, spouse refusing therapy at this time. Will re attempt later as time allows.

## 2022-09-20 LAB
ANION GAP SERPL CALC-SCNC: 3 MMOL/L (ref 0–18)
BASOPHILS # BLD AUTO: 0.03 X10(3) UL (ref 0–0.2)
BASOPHILS NFR BLD AUTO: 0.2 %
BUN BLD-MCNC: 17 MG/DL (ref 7–18)
BUN/CREAT SERPL: 11.7 (ref 10–20)
CALCIUM BLD-MCNC: 9 MG/DL (ref 8.5–10.1)
CHLORIDE SERPL-SCNC: 110 MMOL/L (ref 98–112)
CO2 SERPL-SCNC: 27 MMOL/L (ref 21–32)
CREAT BLD-MCNC: 1.45 MG/DL
DEPRECATED RDW RBC AUTO: 53.4 FL (ref 35.1–46.3)
E COLI DNA BLD POS QL NAA+NON-PROBE: DETECTED
EOSINOPHIL # BLD AUTO: 0.29 X10(3) UL (ref 0–0.7)
EOSINOPHIL NFR BLD AUTO: 2.3 %
ERYTHROCYTE [DISTWIDTH] IN BLOOD BY AUTOMATED COUNT: 14.8 % (ref 11–15)
GFR SERPLBLD BASED ON 1.73 SQ M-ARVRAT: 44 ML/MIN/1.73M2 (ref 60–?)
GLUCOSE BLD-MCNC: 100 MG/DL (ref 70–99)
HCT VFR BLD AUTO: 34.8 %
HGB BLD-MCNC: 10.1 G/DL
IMM GRANULOCYTES # BLD AUTO: 0.13 X10(3) UL (ref 0–1)
IMM GRANULOCYTES NFR BLD: 1 %
LYMPHOCYTES # BLD AUTO: 0.86 X10(3) UL (ref 1–4)
LYMPHOCYTES NFR BLD AUTO: 6.7 %
MCH RBC QN AUTO: 28.3 PG (ref 26–34)
MCHC RBC AUTO-ENTMCNC: 29 G/DL (ref 31–37)
MCV RBC AUTO: 97.5 FL
MONOCYTES # BLD AUTO: 0.97 X10(3) UL (ref 0.1–1)
MONOCYTES NFR BLD AUTO: 7.6 %
NEUTROPHILS # BLD AUTO: 10.55 X10 (3) UL (ref 1.5–7.7)
NEUTROPHILS # BLD AUTO: 10.55 X10(3) UL (ref 1.5–7.7)
NEUTROPHILS NFR BLD AUTO: 82.2 %
OSMOLALITY SERPL CALC.SUM OF ELEC: 292 MOSM/KG (ref 275–295)
PLATELET # BLD AUTO: 185 10(3)UL (ref 150–450)
POTASSIUM SERPL-SCNC: 3.8 MMOL/L (ref 3.5–5.1)
RBC # BLD AUTO: 3.57 X10(6)UL
SODIUM SERPL-SCNC: 140 MMOL/L (ref 136–145)
WBC # BLD AUTO: 12.8 X10(3) UL (ref 4–11)

## 2022-09-20 PROCEDURE — 99233 SBSQ HOSP IP/OBS HIGH 50: CPT | Performed by: INTERNAL MEDICINE

## 2022-09-20 RX ORDER — OXYCODONE HYDROCHLORIDE 20 MG/1
20 TABLET ORAL
COMMUNITY

## 2022-09-20 NOTE — PLAN OF CARE
Patient is progressing well, VSS stable, A&Ox3. Denying pain and cooperating with nursing care. Bed alarm on, patient doesn't really use call light despite education.  at bedside, helping with all care and very patient and gentle.     ___________________________________________________________  Problem: Patient Centered Care  Goal: Patient preferences are identified and integrated in the patient's plan of care  Description: Interventions:  - What would you like us to know as we care for you? From home with   - Provide timely, complete, and accurate information to patient/family  - Incorporate patient and family knowledge, values, beliefs, and cultural backgrounds into the planning and delivery of care  - Encourage patient/family to participate in care and decision-making at the level they choose  - Honor patient and family perspectives and choices  Outcome: Progressing     Problem: Patient/Family Goals  Goal: Patient/Family Long Term Goal  Description: Patient's Long Term Goal: to be released    Interventions:  - antibiotics, therapy evaluation  - See additional Care Plan goals for specific interventions  Outcome: Progressing  Goal: Patient/Family Short Term Goal  Description: Patient's Short Term Goal: improve balance    Interventions:   - therapy evaluation, assistive devices.   - See additional Care Plan goals for specific interventions  Outcome: Progressing     Problem: RESPIRATORY - ADULT  Goal: Achieves optimal ventilation and oxygenation  Description: INTERVENTIONS:  - Assess for changes in respiratory status  - Assess for changes in mentation and behavior  - Position to facilitate oxygenation and minimize respiratory effort  - Oxygen supplementation based on oxygen saturation or ABGs  - Provide Smoking Cessation handout, if applicable  - Encourage broncho-pulmonary hygiene including cough, deep breathe, Incentive Spirometry  - Assess the need for suctioning and perform as needed  - Assess and instruct to report SOB or any respiratory difficulty  - Respiratory Therapy support as indicated  - Manage/alleviate anxiety  - Monitor for signs/symptoms of CO2 retention  Outcome: Progressing     Problem: SKIN/TISSUE INTEGRITY - ADULT  Goal: Skin integrity remains intact  Description: INTERVENTIONS  - Assess and document risk factors for pressure ulcer development  - Assess and document skin integrity  - Monitor for areas of redness and/or skin breakdown  - Initiate interventions, skin care algorithm/standards of care as needed  Outcome: Progressing     Problem: MUSCULOSKELETAL - ADULT  Goal: Return mobility to safest level of function  Description: INTERVENTIONS:  - Assess patient stability and activity tolerance for standing, transferring and ambulating w/ or w/o assistive devices  - Assist with transfers and ambulation using safe patient handling equipment as needed  - Ensure adequate protection for wounds/incisions during mobilization  - Obtain PT/OT consults as needed  - Advance activity as appropriate  - Communicate ordered activity level and limitations with patient/family  Outcome: Progressing     Problem: NEUROLOGICAL - ADULT  Goal: Achieves maximal functionality and self care  Description: INTERVENTIONS  - Monitor swallowing and airway patency with patient fatigue and changes in neurological status  - Encourage and assist patient to increase activity and self care with guidance from PT/OT  - Encourage visually impaired, hearing impaired and aphasic patients to use assistive/communication devices  Outcome: Progressing     Problem: Impaired Functional Mobility  Goal: Achieve highest/safest level of mobility/gait  Description: Interventions:  - Assess patient's functional ability and stability  - Promote increasing activity/tolerance for mobility and gait  - Educate and engage patient/family in tolerated activity level and precautions  Outcome: Progressing     Problem: Impaired Activities of Daily Living  Goal: Achieve highest/safest level of independence in self care  Description: Interventions:  - Assess ability and encourage patient to participate in ADLs to maximize function  - Promote sitting position while performing ADLs such as feeding, grooming, and bathing  - Educate and encourage patient/family in tolerated functional activity level and precautions during self-care  Outcome: Progressing

## 2022-09-20 NOTE — PLAN OF CARE
Problem: Patient Centered Care  Goal: Patient preferences are identified and integrated in the patient's plan of care  Description: Interventions:  - What would you like us to know as we care for you? From home with   - Provide timely, complete, and accurate information to patient/family  - Incorporate patient and family knowledge, values, beliefs, and cultural backgrounds into the planning and delivery of care  - Encourage patient/family to participate in care and decision-making at the level they choose  - Honor patient and family perspectives and choices  Outcome: Progressing     Problem: Patient/Family Goals  Goal: Patient/Family Long Term Goal  Description: Patient's Long Term Goal: to be released    Interventions:  - antibiotics, therapy evaluation  - See additional Care Plan goals for specific interventions  Outcome: Progressing  Goal: Patient/Family Short Term Goal  Description: Patient's Short Term Goal: improve balance    Interventions:   - therapy evaluation, assistive devices.   - See additional Care Plan goals for specific interventions  Outcome: Progressing     Problem: RESPIRATORY - ADULT  Goal: Achieves optimal ventilation and oxygenation  Description: INTERVENTIONS:  - Assess for changes in respiratory status  - Assess for changes in mentation and behavior  - Position to facilitate oxygenation and minimize respiratory effort  - Oxygen supplementation based on oxygen saturation or ABGs  - Provide Smoking Cessation handout, if applicable  - Encourage broncho-pulmonary hygiene including cough, deep breathe, Incentive Spirometry  - Assess the need for suctioning and perform as needed  - Assess and instruct to report SOB or any respiratory difficulty  - Respiratory Therapy support as indicated  - Manage/alleviate anxiety  - Monitor for signs/symptoms of CO2 retention  Outcome: Progressing     Problem: SKIN/TISSUE INTEGRITY - ADULT  Goal: Skin integrity remains intact  Description: INTERVENTIONS  - Assess and document risk factors for pressure ulcer development  - Assess and document skin integrity  - Monitor for areas of redness and/or skin breakdown  - Initiate interventions, skin care algorithm/standards of care as needed  Outcome: Progressing     Problem: MUSCULOSKELETAL - ADULT  Goal: Return mobility to safest level of function  Description: INTERVENTIONS:  - Assess patient stability and activity tolerance for standing, transferring and ambulating w/ or w/o assistive devices  - Assist with transfers and ambulation using safe patient handling equipment as needed  - Ensure adequate protection for wounds/incisions during mobilization  - Obtain PT/OT consults as needed  - Advance activity as appropriate  - Communicate ordered activity level and limitations with patient/family  Outcome: Progressing     Problem: NEUROLOGICAL - ADULT  Goal: Achieves maximal functionality and self care  Description: INTERVENTIONS  - Monitor swallowing and airway patency with patient fatigue and changes in neurological status  - Encourage and assist patient to increase activity and self care with guidance from PT/OT  - Encourage visually impaired, hearing impaired and aphasic patients to use assistive/communication devices  Outcome: Progressing     Problem: Impaired Functional Mobility  Goal: Achieve highest/safest level of mobility/gait  Description: Interventions:  - Assess patient's functional ability and stability  - Promote increasing activity/tolerance for mobility and gait  - Educate and engage patient/family in tolerated activity level and precautions    Outcome: Progressing     Problem: Impaired Activities of Daily Living  Goal: Achieve highest/safest level of independence in self care  Description: Interventions:  - Assess ability and encourage patient to participate in ADLs to maximize function  - Promote sitting position while performing ADLs such as feeding, grooming, and bathing  - Educate and encourage patient/family in tolerated functional activity level and precautions during self-care    Outcome: Progressing   Patient a/ox4; VSS; up to bathroom with standby assist; family at bedside; call light within reach.

## 2022-09-20 NOTE — CM/SW NOTE
09/20/22 1200   CM/SW Screening   Referral [facility] staff; Chart review;Nursing rounds   Patient's Current Mental Status at Time of Assessment Confused or unable to complete assessment   Patient's [address]   Number of Levels in Home 2   Patient lives with Spouse/Significant other;Parent(s)   Patient Status Prior to Admission   Independent with ADLs and Mobility Yes   Discharge Needs   Anticipated D/C needs Home health care; Infusion care  (oxygen)   Cm spoke with pts spouse, she is confused. Spouse confirmed address and provided above info. Per spouse pt is at baseline A&O and self care. He feels this infection has \"made her loopy\". . 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 There is a walker in place but, not being used. 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 No home 02 in use, pt currently on 1L NC sat 97%. Per spouse she has been on RA all day, he does not anticipate a home 02 need. Spouse is agreeable to [facility] if recommended. His MIL is current with [facility] and he is agreeable to use the same agency however, does not know their name. CM requested he provide agency name for ref to be sent. Blood and Urine cx positive for ecoli- sensitivity pending. Will monitor for abx plan. 9/22 1130  PT rec is [facility]. MDO for dc. CM called spouse to f/u on dc plan and provide update on above recs.  is agreeable to [facility] ref being sent as he was unable to identify the current agency servicing his MIL    Ref sent, f2fd done for RN/PT/OT    Per RN pt is on RA, no plan for IV abx at dc.    1530  CM followed up w/ spouse to provide choice. He is not willing to make a decision at this point. He will contact SW/CM if he wants to secure [facility] before dc and choose from available providers. Plan  Home with spouse, [facility] pending choice    / to remain available for support and/or discharge planning. Jaimee Veronica RN    Ext [phone]

## 2022-09-21 PROCEDURE — 99232 SBSQ HOSP IP/OBS MODERATE 35: CPT | Performed by: INTERNAL MEDICINE

## 2022-09-21 RX ORDER — OXYCODONE HYDROCHLORIDE 5 MG/1
20 TABLET ORAL 2 TIMES DAILY PRN
Status: DISCONTINUED | OUTPATIENT
Start: 2022-09-21 | End: 2022-09-23

## 2022-09-21 NOTE — PLAN OF CARE
Patient is progressing well, VSS stable, A&Ox2-3. Denying pain and cooperating with nursing care. Bed alarm on but not using call light despite education.  at bedside, sleeping on the couch. Family in and out last evening. Patient got up to go to the bathroom with me and seemed very unsteady, more than my first night with her on 9/19. I encouraged her to use the walker and educated on the need to prevent falls. _________________________________________________________  Problem: Patient Centered Care  Goal: Patient preferences are identified and integrated in the patient's plan of care  Description: Interventions:  - What would you like us to know as we care for you? From home with   - Provide timely, complete, and accurate information to patient/family  - Incorporate patient and family knowledge, values, beliefs, and cultural backgrounds into the planning and delivery of care  - Encourage patient/family to participate in care and decision-making at the level they choose  - Honor patient and family perspectives and choices  Outcome: Progressing     Problem: Patient/Family Goals  Goal: Patient/Family Long Term Goal  Description: Patient's Long Term Goal: to be released    Interventions:  - antibiotics, therapy evaluation  - See additional Care Plan goals for specific interventions  Outcome: Progressing  Goal: Patient/Family Short Term Goal  Description: Patient's Short Term Goal: improve balance    Interventions:   - therapy evaluation, assistive devices.   - See additional Care Plan goals for specific interventions  Outcome: Progressing     Problem: RESPIRATORY - ADULT  Goal: Achieves optimal ventilation and oxygenation  Description: INTERVENTIONS:  - Assess for changes in respiratory status  - Assess for changes in mentation and behavior  - Position to facilitate oxygenation and minimize respiratory effort  - Oxygen supplementation based on oxygen saturation or ABGs  - Provide Smoking Cessation handout, if applicable  - Encourage broncho-pulmonary hygiene including cough, deep breathe, Incentive Spirometry  - Assess the need for suctioning and perform as needed  - Assess and instruct to report SOB or any respiratory difficulty  - Respiratory Therapy support as indicated  - Manage/alleviate anxiety  - Monitor for signs/symptoms of CO2 retention  Outcome: Progressing     Problem: SKIN/TISSUE INTEGRITY - ADULT  Goal: Skin integrity remains intact  Description: INTERVENTIONS  - Assess and document risk factors for pressure ulcer development  - Assess and document skin integrity  - Monitor for areas of redness and/or skin breakdown  - Initiate interventions, skin care algorithm/standards of care as needed  Outcome: Progressing     Problem: MUSCULOSKELETAL - ADULT  Goal: Return mobility to safest level of function  Description: INTERVENTIONS:  - Assess patient stability and activity tolerance for standing, transferring and ambulating w/ or w/o assistive devices  - Assist with transfers and ambulation using safe patient handling equipment as needed  - Ensure adequate protection for wounds/incisions during mobilization  - Obtain PT/OT consults as needed  - Advance activity as appropriate  - Communicate ordered activity level and limitations with patient/family  Outcome: Progressing     Problem: NEUROLOGICAL - ADULT  Goal: Achieves maximal functionality and self care  Description: INTERVENTIONS  - Monitor swallowing and airway patency with patient fatigue and changes in neurological status  - Encourage and assist patient to increase activity and self care with guidance from PT/OT  - Encourage visually impaired, hearing impaired and aphasic patients to use assistive/communication devices  Outcome: Progressing     Problem: Impaired Functional Mobility  Goal: Achieve highest/safest level of mobility/gait  Description: Interventions:  - Assess patient's functional ability and stability  - Promote increasing activity/tolerance for mobility and gait  - Educate and engage patient/family in tolerated activity level and precautions  Outcome: Progressing     Problem: Impaired Activities of Daily Living  Goal: Achieve highest/safest level of independence in self care  Description: Interventions:  - Assess ability and encourage patient to participate in ADLs to maximize function  - Promote sitting position while performing ADLs such as feeding, grooming, and bathing  - Educate and encourage patient/family in tolerated functional activity level and precautions during self-care  Outcome: Progressing

## 2022-09-21 NOTE — PLAN OF CARE
Problem: Patient Centered Care  Goal: Patient preferences are identified and integrated in the patient's plan of care  Description: Interventions:  - What would you like us to know as we care for you? From home with   - Provide timely, complete, and accurate information to patient/family  - Incorporate patient and family knowledge, values, beliefs, and cultural backgrounds into the planning and delivery of care  - Encourage patient/family to participate in care and decision-making at the level they choose  - Honor patient and family perspectives and choices  Outcome: Progressing     Problem: Patient/Family Goals  Goal: Patient/Family Long Term Goal  Description: Patient's Long Term Goal: to be released    Interventions:  - antibiotics, therapy evaluation  - See additional Care Plan goals for specific interventions  Outcome: Progressing  Goal: Patient/Family Short Term Goal  Description: Patient's Short Term Goal: improve balance    Interventions:   - therapy evaluation, assistive devices.   - See additional Care Plan goals for specific interventions  Outcome: Progressing     Problem: RESPIRATORY - ADULT  Goal: Achieves optimal ventilation and oxygenation  Description: INTERVENTIONS:  - Assess for changes in respiratory status  - Assess for changes in mentation and behavior  - Position to facilitate oxygenation and minimize respiratory effort  - Oxygen supplementation based on oxygen saturation or ABGs  - Provide Smoking Cessation handout, if applicable  - Encourage broncho-pulmonary hygiene including cough, deep breathe, Incentive Spirometry  - Assess the need for suctioning and perform as needed  - Assess and instruct to report SOB or any respiratory difficulty  - Respiratory Therapy support as indicated  - Manage/alleviate anxiety  - Monitor for signs/symptoms of CO2 retention  Outcome: Progressing     Problem: SKIN/TISSUE INTEGRITY - ADULT  Goal: Skin integrity remains intact  Description: INTERVENTIONS  - Assess and document risk factors for pressure ulcer development  - Assess and document skin integrity  - Monitor for areas of redness and/or skin breakdown  - Initiate interventions, skin care algorithm/standards of care as needed  Outcome: Progressing     Problem: MUSCULOSKELETAL - ADULT  Goal: Return mobility to safest level of function  Description: INTERVENTIONS:  - Assess patient stability and activity tolerance for standing, transferring and ambulating w/ or w/o assistive devices  - Assist with transfers and ambulation using safe patient handling equipment as needed  - Ensure adequate protection for wounds/incisions during mobilization  - Obtain PT/OT consults as needed  - Advance activity as appropriate  - Communicate ordered activity level and limitations with patient/family  Outcome: Progressing     Problem: NEUROLOGICAL - ADULT  Goal: Achieves maximal functionality and self care  Description: INTERVENTIONS  - Monitor swallowing and airway patency with patient fatigue and changes in neurological status  - Encourage and assist patient to increase activity and self care with guidance from PT/OT  - Encourage visually impaired, hearing impaired and aphasic patients to use assistive/communication devices  Outcome: Progressing     Problem: Impaired Functional Mobility  Goal: Achieve highest/safest level of mobility/gait  Description: Interventions:  - Assess patient's functional ability and stability  - Promote increasing activity/tolerance for mobility and gait  - Educate and engage patient/family in tolerated activity level and precautions    Outcome: Progressing     Problem: Impaired Activities of Daily Living  Goal: Achieve highest/safest level of independence in self care  Description: Interventions:  - Assess ability and encourage patient to participate in ADLs to maximize function  - Promote sitting position while performing ADLs such as feeding, grooming, and bathing  - Educate and encourage patient/family in tolerated functional activity level and precautions during self-care    Outcome: Progressing   Patient a/ox3; VSS; up to bathroom with walker +standby assist;  at bedside; bed/chair alarms active/audible; encouraging patient to use cosme light for any needs.

## 2022-09-22 LAB
ANION GAP SERPL CALC-SCNC: 5 MMOL/L (ref 0–18)
BASOPHILS # BLD AUTO: 0.06 X10(3) UL (ref 0–0.2)
BASOPHILS NFR BLD AUTO: 0.5 %
BUN BLD-MCNC: 7 MG/DL (ref 7–18)
BUN/CREAT SERPL: 6.3 (ref 10–20)
CALCIUM BLD-MCNC: 9 MG/DL (ref 8.5–10.1)
CHLORIDE SERPL-SCNC: 113 MMOL/L (ref 98–112)
CO2 SERPL-SCNC: 27 MMOL/L (ref 21–32)
CREAT BLD-MCNC: 1.12 MG/DL
DEPRECATED RDW RBC AUTO: 52.4 FL (ref 35.1–46.3)
EOSINOPHIL # BLD AUTO: 0.4 X10(3) UL (ref 0–0.7)
EOSINOPHIL NFR BLD AUTO: 3.3 %
ERYTHROCYTE [DISTWIDTH] IN BLOOD BY AUTOMATED COUNT: 14.8 % (ref 11–15)
GFR SERPLBLD BASED ON 1.73 SQ M-ARVRAT: 60 ML/MIN/1.73M2 (ref 60–?)
GLUCOSE BLD-MCNC: 88 MG/DL (ref 70–99)
HCT VFR BLD AUTO: 32.7 %
HGB BLD-MCNC: 9.8 G/DL
IMM GRANULOCYTES # BLD AUTO: 0.09 X10(3) UL (ref 0–1)
IMM GRANULOCYTES NFR BLD: 0.7 %
LYMPHOCYTES # BLD AUTO: 1.92 X10(3) UL (ref 1–4)
LYMPHOCYTES NFR BLD AUTO: 15.9 %
MCH RBC QN AUTO: 28.5 PG (ref 26–34)
MCHC RBC AUTO-ENTMCNC: 30 G/DL (ref 31–37)
MCV RBC AUTO: 95.1 FL
MONOCYTES # BLD AUTO: 1.31 X10(3) UL (ref 0.1–1)
MONOCYTES NFR BLD AUTO: 10.8 %
NEUTROPHILS # BLD AUTO: 8.31 X10 (3) UL (ref 1.5–7.7)
NEUTROPHILS # BLD AUTO: 8.31 X10(3) UL (ref 1.5–7.7)
NEUTROPHILS NFR BLD AUTO: 68.8 %
OSMOLALITY SERPL CALC.SUM OF ELEC: 297 MOSM/KG (ref 275–295)
PLATELET # BLD AUTO: 259 10(3)UL (ref 150–450)
POTASSIUM SERPL-SCNC: 2.5 MMOL/L (ref 3.5–5.1)
POTASSIUM SERPL-SCNC: 2.9 MMOL/L (ref 3.5–5.1)
RBC # BLD AUTO: 3.44 X10(6)UL
SODIUM SERPL-SCNC: 145 MMOL/L (ref 136–145)
WBC # BLD AUTO: 12.1 X10(3) UL (ref 4–11)

## 2022-09-22 PROCEDURE — 99233 SBSQ HOSP IP/OBS HIGH 50: CPT | Performed by: INTERNAL MEDICINE

## 2022-09-22 RX ORDER — POTASSIUM CHLORIDE 14.9 MG/ML
20 INJECTION INTRAVENOUS ONCE
Status: COMPLETED | OUTPATIENT
Start: 2022-09-22 | End: 2022-09-22

## 2022-09-22 RX ORDER — POTASSIUM CHLORIDE 14.9 MG/ML
20 INJECTION INTRAVENOUS ONCE
Status: COMPLETED | OUTPATIENT
Start: 2022-09-23 | End: 2022-09-23

## 2022-09-22 RX ORDER — CEFADROXIL 500 MG/1
500 CAPSULE ORAL 2 TIMES DAILY
Qty: 20 CAPSULE | Refills: 0 | Status: SHIPPED | OUTPATIENT
Start: 2022-09-22 | End: 2022-10-02

## 2022-09-22 NOTE — PLAN OF CARE
Pt in stable condition. VSS, on RA. Tolerated all scheduled medications, no adverse reactions noted. 0.9 NS infusing continuously at 100 ml/hr. Continuous telemetry monitoring in place. All bed alarms on, call light placed within reach. Patient's  here at the bedside with pt, and helps with pt care. Frequent rounding on pt. Safety precautions in place. Fall precautions in place. No pain voiced from pt. Will continue to monitor for any new acute changes. Problem: Patient Centered Care  Goal: Patient preferences are identified and integrated in the patient's plan of care  Description: Interventions:  - What would you like us to know as we care for you? From home with   - Provide timely, complete, and accurate information to patient/family  - Incorporate patient and family knowledge, values, beliefs, and cultural backgrounds into the planning and delivery of care  - Encourage patient/family to participate in care and decision-making at the level they choose  - Honor patient and family perspectives and choices  Outcome: Progressing     Problem: Patient/Family Goals  Goal: Patient/Family Long Term Goal  Description: Patient's Long Term Goal: to be released    Interventions:  - antibiotics, therapy evaluation  - See additional Care Plan goals for specific interventions  Outcome: Progressing  Goal: Patient/Family Short Term Goal  Description: Patient's Short Term Goal: improve balance    Interventions:   - therapy evaluation, assistive devices.   - See additional Care Plan goals for specific interventions  Outcome: Progressing     Problem: RESPIRATORY - ADULT  Goal: Achieves optimal ventilation and oxygenation  Description: INTERVENTIONS:  - Assess for changes in respiratory status  - Assess for changes in mentation and behavior  - Position to facilitate oxygenation and minimize respiratory effort  - Oxygen supplementation based on oxygen saturation or ABGs  - Provide Smoking Cessation handout, if applicable  - Encourage broncho-pulmonary hygiene including cough, deep breathe, Incentive Spirometry  - Assess the need for suctioning and perform as needed  - Assess and instruct to report SOB or any respiratory difficulty  - Respiratory Therapy support as indicated  - Manage/alleviate anxiety  - Monitor for signs/symptoms of CO2 retention  Outcome: Progressing     Problem: SKIN/TISSUE INTEGRITY - ADULT  Goal: Skin integrity remains intact  Description: INTERVENTIONS  - Assess and document risk factors for pressure ulcer development  - Assess and document skin integrity  - Monitor for areas of redness and/or skin breakdown  - Initiate interventions, skin care algorithm/standards of care as needed  Outcome: Progressing     Problem: MUSCULOSKELETAL - ADULT  Goal: Return mobility to safest level of function  Description: INTERVENTIONS:  - Assess patient stability and activity tolerance for standing, transferring and ambulating w/ or w/o assistive devices  - Assist with transfers and ambulation using safe patient handling equipment as needed  - Ensure adequate protection for wounds/incisions during mobilization  - Obtain PT/OT consults as needed  - Advance activity as appropriate  - Communicate ordered activity level and limitations with patient/family  Outcome: Progressing     Problem: NEUROLOGICAL - ADULT  Goal: Achieves maximal functionality and self care  Description: INTERVENTIONS  - Monitor swallowing and airway patency with patient fatigue and changes in neurological status  - Encourage and assist patient to increase activity and self care with guidance from PT/OT  - Encourage visually impaired, hearing impaired and aphasic patients to use assistive/communication devices  Outcome: Progressing     Problem: Impaired Functional Mobility  Goal: Achieve highest/safest level of mobility/gait  Description: Interventions:  - Assess patient's functional ability and stability  - Promote increasing activity/tolerance for mobility and gait  - Educate and engage patient/family in tolerated activity level and precautions    Outcome: Progressing     Problem: Impaired Activities of Daily Living  Goal: Achieve highest/safest level of independence in self care  Description: Interventions:  - Assess ability and encourage patient to participate in ADLs to maximize function  - Promote sitting position while performing ADLs such as feeding, grooming, and bathing  - Educate and encourage patient/family in tolerated functional activity level and precautions during self-care    Outcome: Progressing

## 2022-09-22 NOTE — PLAN OF CARE
Problem: Patient Centered Care  Goal: Patient preferences are identified and integrated in the patient's plan of care  Description: Interventions:  - What would you like us to know as we care for you? From home with   - Provide timely, complete, and accurate information to patient/family  - Incorporate patient and family knowledge, values, beliefs, and cultural backgrounds into the planning and delivery of care  - Encourage patient/family to participate in care and decision-making at the level they choose  - Honor patient and family perspectives and choices  Outcome: Progressing     Problem: Patient/Family Goals  Goal: Patient/Family Long Term Goal  Description: Patient's Long Term Goal: to be released    Interventions:  - antibiotics, therapy evaluation  - See additional Care Plan goals for specific interventions  Outcome: Progressing  Goal: Patient/Family Short Term Goal  Description: Patient's Short Term Goal: improve balance    Interventions:   - therapy evaluation, assistive devices.   - See additional Care Plan goals for specific interventions  Outcome: Progressing     Problem: RESPIRATORY - ADULT  Goal: Achieves optimal ventilation and oxygenation  Description: INTERVENTIONS:  - Assess for changes in respiratory status  - Assess for changes in mentation and behavior  - Position to facilitate oxygenation and minimize respiratory effort  - Oxygen supplementation based on oxygen saturation or ABGs  - Provide Smoking Cessation handout, if applicable  - Encourage broncho-pulmonary hygiene including cough, deep breathe, Incentive Spirometry  - Assess the need for suctioning and perform as needed  - Assess and instruct to report SOB or any respiratory difficulty  - Respiratory Therapy support as indicated  - Manage/alleviate anxiety  - Monitor for signs/symptoms of CO2 retention  Outcome: Progressing     Problem: SKIN/TISSUE INTEGRITY - ADULT  Goal: Skin integrity remains intact  Description: INTERVENTIONS  - Assess and document risk factors for pressure ulcer development  - Assess and document skin integrity  - Monitor for areas of redness and/or skin breakdown  - Initiate interventions, skin care algorithm/standards of care as needed  Outcome: Progressing     Problem: MUSCULOSKELETAL - ADULT  Goal: Return mobility to safest level of function  Description: INTERVENTIONS:  - Assess patient stability and activity tolerance for standing, transferring and ambulating w/ or w/o assistive devices  - Assist with transfers and ambulation using safe patient handling equipment as needed  - Ensure adequate protection for wounds/incisions during mobilization  - Obtain PT/OT consults as needed  - Advance activity as appropriate  - Communicate ordered activity level and limitations with patient/family  Outcome: Progressing     Problem: NEUROLOGICAL - ADULT  Goal: Achieves maximal functionality and self care  Description: INTERVENTIONS  - Monitor swallowing and airway patency with patient fatigue and changes in neurological status  - Encourage and assist patient to increase activity and self care with guidance from PT/OT  - Encourage visually impaired, hearing impaired and aphasic patients to use assistive/communication devices  Outcome: Progressing     Problem: Impaired Functional Mobility  Goal: Achieve highest/safest level of mobility/gait  Description: Interventions:  - Assess patient's functional ability and stability  - Promote increasing activity/tolerance for mobility and gait  - Educate and engage patient/family in tolerated activity level and precautions  Outcome: Progressing     Problem: Impaired Activities of Daily Living  Goal: Achieve highest/safest level of independence in self care  Description: Interventions:  - Assess ability and encourage patient to participate in ADLs to maximize function  - Promote sitting position while performing ADLs such as feeding, grooming, and bathing  - Educate and encourage patient/family in tolerated functional activity level and precautions during self-care  Outcome: Progressing     Vital signs are stable. Denies pain at this time. Fentanyl patch on the LLE. Potassium covered per protocol. Safety precautions in place.  at bedside.

## 2022-09-22 NOTE — DISCHARGE PLANNING
Patient chart reviewed for discharge: Medication Reconciliation completed, Specialist/PCP follow up listed, and disease specific Instructions/Education included in After Visit Summary. Discharge RN notified patient's RN of AVS completion and verified all consultants have signed off. Patient's RN to notify DC RN if discharge status changes. Chiqui Norman RN, Discharge Leader [id number]    9/22 4944  Primary RN notified [facility] that discharge order cancelled by MD - will remain available for discharge coordination.

## 2022-09-23 VITALS
DIASTOLIC BLOOD PRESSURE: 64 MMHG | SYSTOLIC BLOOD PRESSURE: 134 MMHG | WEIGHT: 169.31 LBS | OXYGEN SATURATION: 95 % | TEMPERATURE: 97 F | HEIGHT: 61 IN | BODY MASS INDEX: 31.97 KG/M2 | HEART RATE: 95 BPM | RESPIRATION RATE: 16 BRPM

## 2022-09-23 PROBLEM — N28.9 RENAL INSUFFICIENCY: Status: RESOLVED | Noted: 2022-09-18 | Resolved: 2022-09-23

## 2022-09-23 LAB
ANION GAP SERPL CALC-SCNC: 4 MMOL/L (ref 0–18)
BASOPHILS # BLD AUTO: 0.1 X10(3) UL (ref 0–0.2)
BASOPHILS NFR BLD AUTO: 0.9 %
BUN BLD-MCNC: 6 MG/DL (ref 7–18)
BUN/CREAT SERPL: 6.5 (ref 10–20)
CALCIUM BLD-MCNC: 9.2 MG/DL (ref 8.5–10.1)
CHLORIDE SERPL-SCNC: 115 MMOL/L (ref 98–112)
CO2 SERPL-SCNC: 27 MMOL/L (ref 21–32)
CREAT BLD-MCNC: 0.93 MG/DL
DEPRECATED RDW RBC AUTO: 53.9 FL (ref 35.1–46.3)
EOSINOPHIL # BLD AUTO: 0.39 X10(3) UL (ref 0–0.7)
EOSINOPHIL NFR BLD AUTO: 3.4 %
ERYTHROCYTE [DISTWIDTH] IN BLOOD BY AUTOMATED COUNT: 14.9 % (ref 11–15)
GFR SERPLBLD BASED ON 1.73 SQ M-ARVRAT: 74 ML/MIN/1.73M2 (ref 60–?)
GLUCOSE BLD-MCNC: 85 MG/DL (ref 70–99)
HCT VFR BLD AUTO: 34.5 %
HGB BLD-MCNC: 10.2 G/DL
IMM GRANULOCYTES # BLD AUTO: 0.37 X10(3) UL (ref 0–1)
IMM GRANULOCYTES NFR BLD: 3.2 %
LYMPHOCYTES # BLD AUTO: 2.37 X10(3) UL (ref 1–4)
LYMPHOCYTES NFR BLD AUTO: 20.5 %
MCH RBC QN AUTO: 28.6 PG (ref 26–34)
MCHC RBC AUTO-ENTMCNC: 29.6 G/DL (ref 31–37)
MCV RBC AUTO: 96.6 FL
MONOCYTES # BLD AUTO: 1.06 X10(3) UL (ref 0.1–1)
MONOCYTES NFR BLD AUTO: 9.2 %
NEUTROPHILS # BLD AUTO: 7.25 X10 (3) UL (ref 1.5–7.7)
NEUTROPHILS # BLD AUTO: 7.25 X10(3) UL (ref 1.5–7.7)
NEUTROPHILS NFR BLD AUTO: 62.8 %
OSMOLALITY SERPL CALC.SUM OF ELEC: 299 MOSM/KG (ref 275–295)
PHOSPHATE SERPL-MCNC: 2.8 MG/DL (ref 2.5–4.9)
PLATELET # BLD AUTO: 346 10(3)UL (ref 150–450)
POTASSIUM SERPL-SCNC: 3.4 MMOL/L (ref 3.5–5.1)
POTASSIUM SERPL-SCNC: 3.4 MMOL/L (ref 3.5–5.1)
RBC # BLD AUTO: 3.57 X10(6)UL
SODIUM SERPL-SCNC: 146 MMOL/L (ref 136–145)
WBC # BLD AUTO: 11.5 X10(3) UL (ref 4–11)

## 2022-09-23 PROCEDURE — 99239 HOSP IP/OBS DSCHRG MGMT >30: CPT | Performed by: INTERNAL MEDICINE

## 2022-09-23 RX ORDER — FUROSEMIDE 20 MG/1
20 TABLET ORAL DAILY
Status: DISCONTINUED | OUTPATIENT
Start: 2022-09-23 | End: 2022-09-23

## 2022-09-23 RX ORDER — ONDANSETRON 4 MG/1
4 TABLET, ORALLY DISINTEGRATING ORAL EVERY 8 HOURS PRN
Qty: 15 TABLET | Refills: 0 | Status: SHIPPED | OUTPATIENT
Start: 2022-09-23

## 2022-09-23 RX ORDER — POTASSIUM CHLORIDE 750 MG/1
10 TABLET, EXTENDED RELEASE ORAL DAILY
Qty: 30 TABLET | Refills: 0 | Status: SHIPPED | OUTPATIENT
Start: 2022-09-23 | End: 2022-09-23

## 2022-09-23 RX ORDER — POTASSIUM CHLORIDE 750 MG/1
10 TABLET, EXTENDED RELEASE ORAL DAILY
Status: DISCONTINUED | OUTPATIENT
Start: 2022-09-23 | End: 2022-09-23

## 2022-09-23 NOTE — PLAN OF CARE
Tolerated meds. Can be forgetful - when she comes from bathroom she thinks it's time to go home, otherwise A&O x4.  helps ambulate to bathroom. Frequent rounding, safety precautions in place, fall precautions in place. Problem: Patient Centered Care  Goal: Patient preferences are identified and integrated in the patient's plan of care  Description: Interventions:  - What would you like us to know as we care for you? From home with   - Provide timely, complete, and accurate information to patient/family  - Incorporate patient and family knowledge, values, beliefs, and cultural backgrounds into the planning and delivery of care  - Encourage patient/family to participate in care and decision-making at the level they choose  - Honor patient and family perspectives and choices  Outcome: Progressing     Problem: Patient/Family Goals  Goal: Patient/Family Long Term Goal  Description: Patient's Long Term Goal: to be released    Interventions:  - antibiotics, therapy evaluation  - See additional Care Plan goals for specific interventions  Outcome: Progressing  Goal: Patient/Family Short Term Goal  Description: Patient's Short Term Goal: improve balance    Interventions:   - therapy evaluation, assistive devices.   - See additional Care Plan goals for specific interventions  Outcome: Progressing     Problem: RESPIRATORY - ADULT  Goal: Achieves optimal ventilation and oxygenation  Description: INTERVENTIONS:  - Assess for changes in respiratory status  - Assess for changes in mentation and behavior  - Position to facilitate oxygenation and minimize respiratory effort  - Oxygen supplementation based on oxygen saturation or ABGs  - Provide Smoking Cessation handout, if applicable  - Encourage broncho-pulmonary hygiene including cough, deep breathe, Incentive Spirometry  - Assess the need for suctioning and perform as needed  - Assess and instruct to report SOB or any respiratory difficulty  - Respiratory Therapy support as indicated  - Manage/alleviate anxiety  - Monitor for signs/symptoms of CO2 retention  Outcome: Progressing     Problem: SKIN/TISSUE INTEGRITY - ADULT  Goal: Skin integrity remains intact  Description: INTERVENTIONS  - Assess and document risk factors for pressure ulcer development  - Assess and document skin integrity  - Monitor for areas of redness and/or skin breakdown  - Initiate interventions, skin care algorithm/standards of care as needed  Outcome: Progressing     Problem: MUSCULOSKELETAL - ADULT  Goal: Return mobility to safest level of function  Description: INTERVENTIONS:  - Assess patient stability and activity tolerance for standing, transferring and ambulating w/ or w/o assistive devices  - Assist with transfers and ambulation using safe patient handling equipment as needed  - Ensure adequate protection for wounds/incisions during mobilization  - Obtain PT/OT consults as needed  - Advance activity as appropriate  - Communicate ordered activity level and limitations with patient/family  Outcome: Progressing     Problem: NEUROLOGICAL - ADULT  Goal: Achieves maximal functionality and self care  Description: INTERVENTIONS  - Monitor swallowing and airway patency with patient fatigue and changes in neurological status  - Encourage and assist patient to increase activity and self care with guidance from PT/OT  - Encourage visually impaired, hearing impaired and aphasic patients to use assistive/communication devices  Outcome: Progressing     Problem: Impaired Functional Mobility  Goal: Achieve highest/safest level of mobility/gait  Description: Interventions:  - Assess patient's functional ability and stability  - Promote increasing activity/tolerance for mobility and gait  - Educate and engage patient/family in tolerated activity level and precautions  Outcome: Progressing     Problem: Impaired Activities of Daily Living  Goal: Achieve highest/safest level of independence in self care  Description: Interventions:  - Assess ability and encourage patient to participate in ADLs to maximize function  - Promote sitting position while performing ADLs such as feeding, grooming, and bathing  - Educate and encourage patient/family in tolerated functional activity level and precautions during self-care  Outcome: Progressing

## 2022-09-23 NOTE — PLAN OF CARE
Went to check on pt- pt not in room and appeared to remove IV: found in bathroom garbage. Checked lounge and halls and notified TL who notified security. Tried to reach pt by phone, spoke to sister who then gave husbands phone number, tried to reach  and no answer. Called  back and left message to pick antibiotic up and for pt to take at 2pm today and asked to call back for further discharge instructions. Josefina Lindsay notified that pt left prior to receiving iv antibiotic and discharge instructions/AVS today. Josefina Lindsay said as long as she picks her meds up from pharmacy she should be fine. Await phone call from . Problem: Patient Centered Care  Goal: Patient preferences are identified and integrated in the patient's plan of care  Description: Interventions:  - What would you like us to know as we care for you? From home with   - Provide timely, complete, and accurate information to patient/family  - Incorporate patient and family knowledge, values, beliefs, and cultural backgrounds into the planning and delivery of care  - Encourage patient/family to participate in care and decision-making at the level they choose  - Honor patient and family perspectives and choices  9/23/2022 1229 by Hugo Us RN  Outcome: Completed  9/23/2022 1049 by Hugo Us RN  Outcome: Progressing     Problem: Patient/Family Goals  Goal: Patient/Family Long Term Goal  Description: Patient's Long Term Goal: to be released    Interventions:  - antibiotics, therapy evaluation  - See additional Care Plan goals for specific interventions  9/23/2022 1229 by Hugo Us, RN  Outcome: Completed  9/23/2022 1049 by Hugo Us RN  Outcome: Progressing  Goal: Patient/Family Short Term Goal  Description: Patient's Short Term Goal: improve balance    Interventions:   - therapy evaluation, assistive devices.   - See additional Care Plan goals for specific interventions  9/23/2022 1229 by Jose Elias Okeefe RN  Outcome: Completed  9/23/2022 1049 by Jose Elias Okeefe RN  Outcome: Progressing     Problem: RESPIRATORY - ADULT  Goal: Achieves optimal ventilation and oxygenation  Description: INTERVENTIONS:  - Assess for changes in respiratory status  - Assess for changes in mentation and behavior  - Position to facilitate oxygenation and minimize respiratory effort  - Oxygen supplementation based on oxygen saturation or ABGs  - Provide Smoking Cessation handout, if applicable  - Encourage broncho-pulmonary hygiene including cough, deep breathe, Incentive Spirometry  - Assess the need for suctioning and perform as needed  - Assess and instruct to report SOB or any respiratory difficulty  - Respiratory Therapy support as indicated  - Manage/alleviate anxiety  - Monitor for signs/symptoms of CO2 retention  9/23/2022 1229 by Jose Elias Okeefe RN  Outcome: Completed  9/23/2022 1049 by Jose Elias Okeefe RN  Outcome: Progressing     Problem: SKIN/TISSUE INTEGRITY - ADULT  Goal: Skin integrity remains intact  Description: INTERVENTIONS  - Assess and document risk factors for pressure ulcer development  - Assess and document skin integrity  - Monitor for areas of redness and/or skin breakdown  - Initiate interventions, skin care algorithm/standards of care as needed  9/23/2022 1229 by Jose Elias Okeefe RN  Outcome: Completed  9/23/2022 1049 by Jose Elias Okeefe RN  Outcome: Progressing     Problem: MUSCULOSKELETAL - ADULT  Goal: Return mobility to safest level of function  Description: INTERVENTIONS:  - Assess patient stability and activity tolerance for standing, transferring and ambulating w/ or w/o assistive devices  - Assist with transfers and ambulation using safe patient handling equipment as needed  - Ensure adequate protection for wounds/incisions during mobilization  - Obtain PT/OT consults as needed  - Advance activity as appropriate  - Communicate ordered activity level and limitations with patient/family  9/23/2022 1229 by Blade Garcia RN  Outcome: Completed  9/23/2022 1049 by Blade Garcia RN  Outcome: Progressing     Problem: NEUROLOGICAL - ADULT  Goal: Achieves maximal functionality and self care  Description: INTERVENTIONS  - Monitor swallowing and airway patency with patient fatigue and changes in neurological status  - Encourage and assist patient to increase activity and self care with guidance from PT/OT  - Encourage visually impaired, hearing impaired and aphasic patients to use assistive/communication devices  9/23/2022 1229 by Blade Garcia RN  Outcome: Completed  9/23/2022 1049 by Blade Garcia RN  Outcome: Progressing     Problem: Impaired Functional Mobility  Goal: Achieve highest/safest level of mobility/gait  Description: Interventions:  - Assess patient's functional ability and stability  - Promote increasing activity/tolerance for mobility and gait  - Educate and engage patient/family in tolerated activity level and precautions  9/23/2022 1229 by Blade Garcia RN  Outcome: Completed  9/23/2022 1049 by Blade Garcia RN  Outcome: Progressing     Problem: Impaired Activities of Daily Living  Goal: Achieve highest/safest level of independence in self care  Description: Interventions:  - Assess ability and encourage patient to participate in ADLs to maximize function  - Promote sitting position while performing ADLs such as feeding, grooming, and bathing  - Educate and encourage patient/family in tolerated functional activity level and precautions during self-care  9/23/2022 1229 by Blade Garcia RN  Outcome: Completed  9/23/2022 1049 by Blade Garcia RN  Outcome: Progressing

## 2022-09-23 NOTE — PLAN OF CARE
Pt in stable condition. VSS, on RA. Tolerated all scheduled medications, no adverse reactions noted. 0.9 NS infusing continuously at 50 ml/hr. All bed alarms on, call light placed within reach. Patient's  here at the bedside with pt, and helps with pt care. Frequent rounding on pt. Safety precautions in place. Fall precautions in place. PRN Norco given for pt c/o migraine, & PRN Zofran given for episode of emesis. Pt's appetite has been very poor since being hospitalized. Potassium being covered per protocol, last potassium at 2.9. New Fentanyl patch placed to LLE. Will continue to monitor for any new acute changes. Problem: Patient Centered Care  Goal: Patient preferences are identified and integrated in the patient's plan of care  Description: Interventions:  - What would you like us to know as we care for you? From home with   - Provide timely, complete, and accurate information to patient/family  - Incorporate patient and family knowledge, values, beliefs, and cultural backgrounds into the planning and delivery of care  - Encourage patient/family to participate in care and decision-making at the level they choose  - Honor patient and family perspectives and choices  Outcome: Progressing     Problem: Patient/Family Goals  Goal: Patient/Family Long Term Goal  Description: Patient's Long Term Goal: to be released    Interventions:  - antibiotics, therapy evaluation  - See additional Care Plan goals for specific interventions  Outcome: Progressing  Goal: Patient/Family Short Term Goal  Description: Patient's Short Term Goal: improve balance    Interventions:   - therapy evaluation, assistive devices.   - See additional Care Plan goals for specific interventions  Outcome: Progressing     Problem: RESPIRATORY - ADULT  Goal: Achieves optimal ventilation and oxygenation  Description: INTERVENTIONS:  - Assess for changes in respiratory status  - Assess for changes in mentation and behavior  - Position to facilitate oxygenation and minimize respiratory effort  - Oxygen supplementation based on oxygen saturation or ABGs  - Provide Smoking Cessation handout, if applicable  - Encourage broncho-pulmonary hygiene including cough, deep breathe, Incentive Spirometry  - Assess the need for suctioning and perform as needed  - Assess and instruct to report SOB or any respiratory difficulty  - Respiratory Therapy support as indicated  - Manage/alleviate anxiety  - Monitor for signs/symptoms of CO2 retention  Outcome: Progressing     Problem: SKIN/TISSUE INTEGRITY - ADULT  Goal: Skin integrity remains intact  Description: INTERVENTIONS  - Assess and document risk factors for pressure ulcer development  - Assess and document skin integrity  - Monitor for areas of redness and/or skin breakdown  - Initiate interventions, skin care algorithm/standards of care as needed  Outcome: Progressing     Problem: MUSCULOSKELETAL - ADULT  Goal: Return mobility to safest level of function  Description: INTERVENTIONS:  - Assess patient stability and activity tolerance for standing, transferring and ambulating w/ or w/o assistive devices  - Assist with transfers and ambulation using safe patient handling equipment as needed  - Ensure adequate protection for wounds/incisions during mobilization  - Obtain PT/OT consults as needed  - Advance activity as appropriate  - Communicate ordered activity level and limitations with patient/family  Outcome: Progressing     Problem: NEUROLOGICAL - ADULT  Goal: Achieves maximal functionality and self care  Description: INTERVENTIONS  - Monitor swallowing and airway patency with patient fatigue and changes in neurological status  - Encourage and assist patient to increase activity and self care with guidance from PT/OT  - Encourage visually impaired, hearing impaired and aphasic patients to use assistive/communication devices  Outcome: Progressing     Problem: Impaired Functional Mobility  Goal: Achieve highest/safest level of mobility/gait  Description: Interventions:  - Assess patient's functional ability and stability  - Promote increasing activity/tolerance for mobility and gait  - Educate and engage patient/family in tolerated activity level and precautions    Outcome: Progressing     Problem: Impaired Activities of Daily Living  Goal: Achieve highest/safest level of independence in self care  Description: Interventions:  - Assess ability and encourage patient to participate in ADLs to maximize function  - Promote sitting position while performing ADLs such as feeding, grooming, and bathing  - Educate and encourage patient/family in tolerated functional activity level and precautions during self-care    Outcome: Progressing

## 2022-09-23 NOTE — CM/SW NOTE
CM [facility] with pt [facility]. Per pt declines HH at this time. CM/SW to remain available for support and/or discharge planning.     Tracie Foy RN, [facility]    Ext.  [phone]

## 2022-10-18 ENCOUNTER — LAB ENCOUNTER (OUTPATIENT)
Dept: LAB | Age: 52
End: 2022-10-18
Attending: INTERNAL MEDICINE
Payer: MEDICARE

## 2022-10-18 DIAGNOSIS — E87.6 HYPOKALEMIA: ICD-10-CM

## 2022-10-18 DIAGNOSIS — N28.9 RENAL INSUFFICIENCY: ICD-10-CM

## 2022-10-18 LAB
ANION GAP SERPL CALC-SCNC: 4 MMOL/L (ref 0–18)
BUN BLD-MCNC: 9 MG/DL (ref 7–18)
BUN/CREAT SERPL: 7.4 (ref 10–20)
CALCIUM BLD-MCNC: 10.6 MG/DL (ref 8.5–10.1)
CHLORIDE SERPL-SCNC: 106 MMOL/L (ref 98–112)
CO2 SERPL-SCNC: 29 MMOL/L (ref 21–32)
CREAT BLD-MCNC: 1.22 MG/DL
GFR SERPLBLD BASED ON 1.73 SQ M-ARVRAT: 54 ML/MIN/1.73M2 (ref 60–?)
GLUCOSE BLD-MCNC: 127 MG/DL (ref 70–99)
OSMOLALITY SERPL CALC.SUM OF ELEC: 288 MOSM/KG (ref 275–295)
POTASSIUM SERPL-SCNC: 3.4 MMOL/L (ref 3.5–5.1)
SODIUM SERPL-SCNC: 139 MMOL/L (ref 136–145)

## 2022-10-18 PROCEDURE — 36415 COLL VENOUS BLD VENIPUNCTURE: CPT

## 2022-10-18 PROCEDURE — 80048 BASIC METABOLIC PNL TOTAL CA: CPT

## 2023-05-31 ENCOUNTER — HOSPITAL ENCOUNTER (OUTPATIENT)
Age: 53
Discharge: HOME | End: 2023-05-31
Payer: MEDICARE

## 2023-05-31 ENCOUNTER — APPOINTMENT (OUTPATIENT)
Dept: GENERAL RADIOLOGY | Age: 53
End: 2023-05-31
Attending: NURSE PRACTITIONER
Payer: MEDICARE

## 2023-05-31 VITALS
SYSTOLIC BLOOD PRESSURE: 131 MMHG | DIASTOLIC BLOOD PRESSURE: 71 MMHG | OXYGEN SATURATION: 95 % | TEMPERATURE: 98 F | HEART RATE: 92 BPM | RESPIRATION RATE: 18 BRPM

## 2023-05-31 DIAGNOSIS — S70.01XA CONTUSION OF RIGHT HIP, INITIAL ENCOUNTER: ICD-10-CM
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
## 2023-05-31 PROCEDURE — 99213 OFFICE O/P EST LOW 20 MIN: CPT | Performed by: NURSE PRACTITIONER

## 2023-05-31 PROCEDURE — 73502 X-RAY EXAM HIP UNI 2-3 VIEWS: CPT | Performed by: NURSE PRACTITIONER

## 2023-05-31 NOTE — ED INITIAL ASSESSMENT (HPI)
Pt fell down concrete stairs a few months ago. Mechanical fall down carpeted stairs yesterday, approx 15 stairs. C/o right hip pain, rates pain 7/10. Denies hitting head. Hx left hip fracture.

## 2023-05-31 NOTE — DISCHARGE INSTRUCTIONS
You may alternate ice pack or heat to the right hip. Continue your home prescribed pain medications. Follow-up with your primary care provider if you continue to have pain and for additional pain management options if needed. If you wake up cannot feel your lower extremities or ambulate you unintentionally urinate or have a bowel movement on yourself or pain location you did not initially have go to the nearest emergency department.

## 2023-06-13 ENCOUNTER — OFFICE VISIT (OUTPATIENT)
Dept: FAMILY MEDICINE CLINIC | Facility: CLINIC | Age: 53
End: 2023-06-13
Payer: COMMERCIAL

## 2023-06-13 VITALS
RESPIRATION RATE: 16 BRPM | TEMPERATURE: 98 F | BODY MASS INDEX: 33.89 KG/M2 | WEIGHT: 172.63 LBS | SYSTOLIC BLOOD PRESSURE: 118 MMHG | DIASTOLIC BLOOD PRESSURE: 68 MMHG | OXYGEN SATURATION: 98 % | HEART RATE: 92 BPM | HEIGHT: 60 IN

## 2023-06-13 DIAGNOSIS — J22 ACUTE LOWER RESPIRATORY INFECTION: Primary | ICD-10-CM

## 2023-06-13 DIAGNOSIS — J98.01 ACUTE BRONCHOSPASM: ICD-10-CM

## 2023-06-13 PROCEDURE — 3008F BODY MASS INDEX DOCD: CPT | Performed by: PHYSICIAN ASSISTANT

## 2023-06-13 PROCEDURE — 99204 OFFICE O/P NEW MOD 45 MIN: CPT | Performed by: PHYSICIAN ASSISTANT

## 2023-06-13 PROCEDURE — 94640 AIRWAY INHALATION TREATMENT: CPT | Performed by: PHYSICIAN ASSISTANT

## 2023-06-13 PROCEDURE — 3078F DIAST BP <80 MM HG: CPT | Performed by: PHYSICIAN ASSISTANT

## 2023-06-13 PROCEDURE — 3074F SYST BP LT 130 MM HG: CPT | Performed by: PHYSICIAN ASSISTANT

## 2023-06-13 RX ORDER — ALBUTEROL SULFATE 90 UG/1
2 AEROSOL, METERED RESPIRATORY (INHALATION) EVERY 6 HOURS PRN
Qty: 1 EACH | Refills: 0 | Status: SHIPPED | OUTPATIENT
Start: 2023-06-13

## 2023-06-13 RX ORDER — ALBUTEROL SULFATE 2.5 MG/3ML
2.5 SOLUTION RESPIRATORY (INHALATION) ONCE
Status: COMPLETED | OUTPATIENT
Start: 2023-06-13 | End: 2023-06-13

## 2023-06-13 RX ORDER — PREDNISONE 20 MG/1
40 TABLET ORAL DAILY
Qty: 10 TABLET | Refills: 0 | Status: SHIPPED | OUTPATIENT
Start: 2023-06-13 | End: 2023-06-18

## 2023-06-13 RX ORDER — DOXYCYCLINE HYCLATE 100 MG/1
100 CAPSULE ORAL 2 TIMES DAILY
Qty: 14 CAPSULE | Refills: 0 | Status: SHIPPED | OUTPATIENT
Start: 2023-06-13 | End: 2023-06-20

## 2023-06-13 RX ADMIN — ALBUTEROL SULFATE 2.5 MG: 2.5 SOLUTION RESPIRATORY (INHALATION) at 12:28:00

## 2024-04-29 ENCOUNTER — HOSPITAL ENCOUNTER (OUTPATIENT)
Age: 54
Discharge: HOME | End: 2024-04-29
Payer: MEDICARE

## 2024-04-29 ENCOUNTER — APPOINTMENT (OUTPATIENT)
Dept: ULTRASOUND IMAGING | Age: 54
End: 2024-04-29
Payer: MEDICARE

## 2024-04-29 VITALS
HEART RATE: 92 BPM | OXYGEN SATURATION: 93 % | DIASTOLIC BLOOD PRESSURE: 56 MMHG | TEMPERATURE: 99 F | SYSTOLIC BLOOD PRESSURE: 120 MMHG | RESPIRATION RATE: 16 BRPM

## 2024-04-29 DIAGNOSIS — M79.89 LEG SWELLING: Primary | ICD-10-CM

## 2024-04-29 DIAGNOSIS — R60.0 EXTREMITY EDEMA: ICD-10-CM

## 2024-04-29 DIAGNOSIS — I87.2 VENOUS INSUFFICIENCY OF RIGHT LEG: ICD-10-CM

## 2024-04-29 PROCEDURE — 99213 OFFICE O/P EST LOW 20 MIN: CPT

## 2024-04-29 PROCEDURE — 93971 EXTREMITY STUDY: CPT

## 2024-04-29 RX ORDER — CEFADROXIL 500 MG/1
500 CAPSULE ORAL 2 TIMES DAILY
Qty: 20 CAPSULE | Refills: 0 | Status: SHIPPED | OUTPATIENT
Start: 2024-04-29 | End: 2024-05-09

## 2024-04-29 NOTE — ED INITIAL ASSESSMENT (HPI)
Pt c/o swelling to right lower extremity for more than a week, denies injury, pt on lasix 40 mg daily

## 2024-04-29 NOTE — DISCHARGE INSTRUCTIONS
There is no blood clot in your leg.  I am going to send a prescription to treat for possible infection given the swelling is only on one side of the leg.  Please take as prescribed.  Please make an appointment to follow-up with the vascular specialist as discussed.  Wear compression socks and elevate the leg as often as you can.  If you develop any fever, chest pain, shortness of breath, worsening swelling, numbness or tingling to your toes or any other worsening or concerning complaints you should go to the emergency department.  Otherwise please follow-up with your primary care provider.

## 2024-04-29 NOTE — ED PROVIDER NOTES
Patient Seen in: [facility]      History     Chief Complaint   Patient presents with    Swelling Edema     Stated Complaint: lft foot/leg pain    Subjective:   Izabel is a 53-year-old female presenting to the immediate care complaining of swelling to her right leg.  Patient states that it has been swollen for about a week or 2.  Patient denies any fall, injury or trauma.  Patient states she does take Lasix daily.  Patient denies any weakness, numbness, tingling to the leg.  No recent travel or surgery.  No history of DVTs.  Patient smokes.  No hormone therapy.  There has not been any redness or warmth.  She also denies any chest pain, shortness of breath, headache, dizziness, abdominal pain or vomiting. Patient states that she is otherwise feeling well.  She has no other complaints or concerns.          Objective:   No pertinent past medical history.            No pertinent past surgical history.              No pertinent social history.            Review of Systems    Positive for stated complaint: lft foot/leg pain  Other systems are as noted in HPI.  Constitutional and vital signs reviewed.      All other systems reviewed and negative except as noted above.    Physical Exam     ED Triage Vitals [04/29/24 1203]   /56   Pulse 92   Resp 16   Temp 98.6 °F (37 °C)   Temp src Temporal   SpO2 93 %   O2 Device None (Room air)       Current:/56   Pulse 92   Temp 98.6 °F (37 °C) (Temporal)   Resp 16   SpO2 93%         Physical Exam  Vitals and nursing note reviewed.   Constitutional:       General: She is not in acute distress.     Appearance: Normal appearance. She is not ill-appearing, toxic-appearing or diaphoretic.   HENT:      Head: Normocephalic.   Cardiovascular:      Rate and Rhythm: Normal rate.   Pulmonary:      Effort: Pulmonary effort is normal.   Musculoskeletal:         General: Normal range of motion.      Cervical back: Normal range of motion.      Right lower leg: Swelling  present. No deformity, lacerations, tenderness or bony tenderness. 2+ Edema present.      Left lower leg: Normal.      Right ankle: Swelling present. No deformity, ecchymosis or lacerations. No tenderness. Normal range of motion. Anterior drawer test negative. Normal pulse.      Left ankle: Normal.      Comments: Positive CMS.  2+ DP and PT pulses.  Capillary refill less than 2 seconds.  Full range of motion to the entire right lower extremity.  The right thigh, calf, ankle and foot have swelling/edema.  Patient has no calf pain, tenderness, swelling, erythema or warmth.  Patient does have some mild tenderness to the right posterior knee.  Bony tenderness.  No abrasions or lacerations noted.  No ecchymosis noted.  No obvious deformity noted.  No drainage or discharge noted.    Skin:     General: Skin is warm and dry.      Capillary Refill: Capillary refill takes less than 2 seconds.   Neurological:      General: No focal deficit present.      Mental Status: She is alert and oriented to person, place, and time.   Psychiatric:         Mood and Affect: Mood normal.         Behavior: Behavior normal.         Thought Content: Thought content normal.         Judgment: Judgment normal.              ED Course   Labs Reviewed - No data to display  US VENOUS DOPPLER LEG RIGHT - DIAG IMG (CPT=93971)    Result Date: 4/29/2024  CONCLUSION: No DVT.    Dictated by (CST): Mickey Moody MD on 4/29/2024 at 1:08 PM     Finalized by (CST): Mickey Moody MD on 4/29/2024 at 1:08 PM              MDM        Medical Decision Making  Multiple medical diagnoses were considered including but not limited to DVT, cellulitis, venous insufficiency, less likely heart failure.  Patient is well appearing, non-toxic and in no acute distress.  Vital signs are stable.   History and physical exam are consistent with venous insufficiency to the right lower extremity.    Ultrasound was negative for DVT.  Patient does have significant swelling to the right  leg when compared to the left.  There is very minimal tenderness to the posterior knee.  No palpable Baker's cyst.  No Baker's cyst on ultrasound.  No bony knee pain.  While I do believe this is venous insufficiency I will treat for cellulitis given the amount of swelling.  Sent a prescription for cefadroxil for cellulitis.  Recommended that if patient does not see any improvement in symptoms within the next 3 to 4 days they should see primary care doctor.  Also recommended the patient make an appointment to see a vascular specialist.  Recommended that if the patient develops any numbness, tingling, worsening pain, fever, chest pain, shortness of breath or any other worsening or concerning complaints that she go to the emergency department.  Otherwise recommended following up with primary care provider.  ED precautions discussed.  Patient (guardian) advised to follow up with PCP in 2-3 days.  Patient (guardian) agrees with this plan of care.  Patient (guardian) verbalizes understanding of discharge instructions and plan of care.      Amount and/or Complexity of Data Reviewed  Radiology: ordered. Decision-making details documented in ED Course.    Risk  OTC drugs.  Prescription drug management.        Disposition and Plan     Clinical Impression:  1. Leg swelling    2. Venous insufficiency of right leg    3. Extremity edema         Disposition:  Discharge  4/29/2024  1:13 pm    Follow-up:  Anay Gould, DO  [address] [place], [place] [address]  [address] [address] [phone]          Najjar, Samer F, MD  [address] [id number] [phone]                Medications Prescribed:  Discharge Medication List as of 4/29/2024  1:17 PM        START taking these medications    Details   cefadroxil 500 MG Oral Cap Take 1 capsule (500 mg total) by mouth 2 (two) times daily for 10 days., Normal, Disp-20 capsule, R-0

## 2025-02-13 ENCOUNTER — OFFICE VISIT (OUTPATIENT)
Dept: OBGYN CLINIC | Facility: CLINIC | Age: 55
End: 2025-02-13

## 2025-02-13 VITALS
BODY MASS INDEX: 29.41 KG/M2 | SYSTOLIC BLOOD PRESSURE: 123 MMHG | HEIGHT: 60 IN | WEIGHT: 149.81 LBS | DIASTOLIC BLOOD PRESSURE: 76 MMHG | HEART RATE: 114 BPM

## 2025-02-13 DIAGNOSIS — Z71.6 ENCOUNTER FOR TOBACCO USE CESSATION COUNSELING: ICD-10-CM

## 2025-02-13 DIAGNOSIS — Z12.31 SCREENING MAMMOGRAM, ENCOUNTER FOR: ICD-10-CM

## 2025-02-13 DIAGNOSIS — Z01.419 WELL WOMAN EXAM: Primary | ICD-10-CM

## 2025-02-13 DIAGNOSIS — Z12.4 CERVICAL CANCER SCREENING: ICD-10-CM

## 2025-02-13 DIAGNOSIS — Z12.4 SCREENING FOR CERVICAL CANCER: ICD-10-CM

## 2025-02-13 PROCEDURE — 3008F BODY MASS INDEX DOCD: CPT | Performed by: OBSTETRICS & GYNECOLOGY

## 2025-02-13 PROCEDURE — 3078F DIAST BP <80 MM HG: CPT | Performed by: OBSTETRICS & GYNECOLOGY

## 2025-02-13 PROCEDURE — G0101 CA SCREEN;PELVIC/BREAST EXAM: HCPCS | Performed by: OBSTETRICS & GYNECOLOGY

## 2025-02-13 PROCEDURE — 3074F SYST BP LT 130 MM HG: CPT | Performed by: OBSTETRICS & GYNECOLOGY

## 2025-02-13 NOTE — PROGRESS NOTES
Chief Complaint   Patient presents with    Gyn Exam     ANNUAL EXAM, mammogram ORDER       HPI:  The patient is a 55 yo F here for WWE.  No postmenopausal bleeding.  Having pain with insertion during IC.  KY.     No LMP recorded. (Menstrual status: Menopause).        Latest Ref Rng & Units 10/8/2020     5:12 PM   RECENT PAP RESULTS   INTERPRETATION/RESULT: Negative for intraepithelial lesion or malignancy Negative for intraepithelial lesion or malignancy    HPV Negative Negative         Menarche: 12  Period Cycle (Days): irregular  Pap Date: 10/08/20  Pap Result Notes: NEG PAP / NEG HPV    MAMMO BILATERAL 11/10/20 BENIGN      Chaperone: declines      Depression Screening (PHQ-2/PHQ-9): Over the LAST 2 WEEKS   Little interest or pleasure in doing things: Not at all    Feeling down, depressed, or hopeless: Not at all    PHQ-2 SCORE: 0          Reviewed medical and surgical history below       OBSTETRICS HISTORY:  OB History    Para Term  AB Living   1 0 0 0 1 0   SAB IAB Ectopic Multiple Live Births   0 1 0 0 0       GYNE HISTORY:  History   Sexual Activity    Sexual activity: Yes    Birth control/ protection:      Menarche: 12  Period Cycle (Days): irregular      MEDICAL HISTORY:  Past Medical History:    Anxiety    Bipolar 1 disorder (HCC)    Birth control    \"birth control tabs\"    Brain aneurysm (HCC)    Depression    Fibromyalgia    chronic pain    Hip fracture (HCC)    femur pinning, pelvis    Hyperlipidemia    Manic behavior (HCC)    Multiple falls    Wound dehiscence    R knee, Debridement R knee wound, flap closure       SURGICAL HISTORY:  Past Surgical History:   Procedure Laterality Date    Brain surgery      aneurysm    Foot surgery      Fracture surgery      Knee surgery  2014    Debridement R knee wound, flap closure    Pelvis/hip joint surgery unlisted      femur pinning, pelvis       SOCIAL HISTORY:  Social History     Socioeconomic History    Marital status:      Spouse  name: Not on file    Number of children: Not on file    Years of education: Not on file    Highest education level: Not on file   Occupational History    Not on file   Tobacco Use    Smoking status: Every Day     Current packs/day: 0.50     Average packs/day: 0.5 packs/day for 28.0 years (14.0 ttl pk-yrs)     Types: Cigarettes    Smokeless tobacco: Never   Vaping Use    Vaping status: Never Used   Substance and Sexual Activity    Alcohol use: No     Alcohol/week: 0.0 standard drinks of alcohol    Drug use: No    Sexual activity: Yes   Other Topics Concern     Service Not Asked    Blood Transfusions Not Asked    Caffeine Concern No    Occupational Exposure Not Asked    Hobby Hazards Not Asked    Sleep Concern Not Asked    Stress Concern Not Asked    Weight Concern Not Asked    Special Diet Not Asked    Back Care Not Asked    Exercise Not Asked    Bike Helmet Not Asked    Seat Belt Not Asked    Self-Exams Not Asked   Social History Narrative    Not on file     Social Drivers of Health     Food Insecurity: Not on file   Transportation Needs: Not on file   Stress: Not on file   Housing Stability: Not on file       FAMILY HISTORY:  Family History   Problem Relation Age of Onset    Hypertension Mother     Uterine Cancer Mother     Prostate Cancer Father     Dementia Father        MEDICATIONS:    Current Outpatient Medications:     Spacer/Aero-Holding Chambers Does not apply Device, dispense 1 spacer to be used with MDI, Disp: 1 each, Rfl: 0    ondansetron 4 MG Oral Tablet Dispersible, Take 1 tablet (4 mg total) by mouth every 8 (eight) hours as needed for Nausea., Disp: 15 tablet, Rfl: 0    oxyCODONE HCl 20 MG Oral Tab, Take 1 tablet (20 mg total) by mouth. Takes 2 tabs (40mg BID) and 20mg nightly, Disp: , Rfl:     rOPINIRole HCl 0.25 MG Oral Tab, TAKE 1 TABLET BY MOUTH ONCE A DAY 1 TO 3 HOURS BEFORE BEDTIME, Disp: , Rfl:     Lithium Carbonate 300 MG Oral Cap, Take 1 capsule (300 mg total) by mouth nightly., Disp:  , Rfl:     clonazePAM 1 MG Oral Tablet Dispersible, Take 1 tablet (1 mg total) by mouth 2 (two) times daily as needed., Disp: , Rfl:     DULoxetine HCl 60 MG Oral Cap DR Particles, Take 1 capsule (60 mg total) by mouth daily., Disp: , Rfl:     Pravastatin Sodium (PRAVACHOL) 40 MG Oral Tab, Take 1 tablet (40 mg total) by mouth nightly., Disp: , Rfl: 0    morphINE Sulfate ER (MS CONTIN) 60 MG Oral Tab CR, , Disp: , Rfl: 0    Mometasone Furoate (ELOCON) 0.1 % Apply Externally Cream, , Disp: , Rfl: 2    furosemide (LASIX) 40 MG Oral Tab, Take 1 tablet (40 mg total) by mouth daily., Disp: , Rfl: 1    fentaNYL (DURAGESIC) 100 MCG/HR Transdermal Patch 72 Hr, , Disp: , Rfl: 0    Cyclobenzaprine HCl (CYCLOBENZAPRINE) 10 MG Oral Tab, Take 1 tablet (10 mg total) by mouth nightly., Disp: , Rfl: 0    albuterol 108 (90 Base) MCG/ACT Inhalation Aero Soln, Inhale 2 puffs into the lungs every 6 (six) hours as needed for Wheezing or Shortness of Breath. (Patient not taking: Reported on 2/13/2025), Disp: 1 each, Rfl: 0    ALLERGIES:  Allergies[1]      Review of Systems:  Constitutional:  Denies fevers and chills   Cardiovascular:  denies chest pain or palpitations  Respiratory:  denies shortness of breath  Gastrointestinal:  denies heartburn, abdominal pain, diarrhea or constipation  Genitourinary:  see HPI  Musculoskeletal:  denies back pain.  Skin/Breast:  Denies any breast pain, lumps, or discharge.   Neurological:  denies headaches, extremity weakness  Psychiatric: denies depression or anxiety.      Vitals:    02/13/25 1403   BP: 123/76   Pulse: 114       PHYSICAL EXAM:   Constitutional: well developed, well nourished  Head/Face: normocephalic  Neck/Thyroid: thyroid symmetric, no thyromegaly, no nodules, no adenopathy  Heart: Regular rate and rhythm   Lungs: clear to ascultation bilaterally   Lymphatic:no abnormal supraclavicular or axillary adenopathy is noted  Breast: normal without palpable masses, tenderness, asymmetry, nipple  discharge, nipple retraction or skin changes  Abdomen:  soft, nontender, nondistended, no masses  Skin/Hair: no unusual rashes or bruises  Extremities: no edema, no cyanosis  Psychiatric: Appropriate mood and affect    Pelvic Exam:  External Genitalia: normal appearance, hair distribution, and no lesions  Urethral Meatus:  normal in size, location, without lesions and prolapse  Bladder:  No fullness, masses or tenderness  Vagina:  Normal appearance without lesions; thick white dc noted  Cervix:  Normal without tenderness on motion  Uterus: normal in size, contour, position, mobility, without tenderness  Adnexa: normal without masses or tenderness  Perineum: normal    Assessment/Plan:  Izabel was seen today for gyn exam.    Diagnoses and all orders for this visit:    Well woman exam    Cervical cancer screening    Screening mammogram, encounter for  -     [facility] 2D+3D SCREENING BILAT (CPT=77067/39055); Future    Encounter for tobacco use cessation counseling  -     Smoking Cessation less than 3 minutes      WWE:   Reviewed ASCCP guidelines with the patient -- Pap today  Vaginal cultures collected; uberlube rec'd  Contraception: n/a  Breast Health:     Mammo rx'ed  Encouraged monthly self breast exams with the patient   Discussed diet, exercise, MVIs with Vit D  Follow up in 1 yr for WWE  Tobacco:  Social History     Tobacco Use   Smoking Status Every Day    Current packs/day: 0.50    Average packs/day: 0.5 packs/day for 28.0 years (14.0 ttl pk-yrs)    Types: Cigarettes   Smokeless Tobacco Never     E-Cigarettes/Vaping       Questions Responses    E-Cigarette Use Never User           Tobacco cessation counseling for <3 minutes.               [1] No Known Allergies

## 2025-02-14 LAB
BV BACTERIA DNA VAG QL NAA+PROBE: POSITIVE
C GLABRATA DNA VAG QL NAA+PROBE: NEGATIVE
C KRUSEI DNA VAG QL NAA+PROBE: NEGATIVE
CANDIDA DNA VAG QL NAA+PROBE: NEGATIVE
HPV E6+E7 MRNA CVX QL NAA+PROBE: NEGATIVE
T VAGINALIS DNA VAG QL NAA+PROBE: NEGATIVE

## 2025-02-14 RX ORDER — METRONIDAZOLE 7.5 MG/G
1 GEL VAGINAL NIGHTLY
Qty: 70 G | Refills: 0 | Status: SHIPPED | OUTPATIENT
Start: 2025-02-14 | End: 2025-02-19

## 2025-02-14 NOTE — PROGRESS NOTES
Pt advised of results and recs and verbalized understanding. Patient chose Metrogel. Pharmacy verified and prescription sent.

## 2025-04-09 ENCOUNTER — TELEPHONE (OUTPATIENT)
Dept: OBGYN CLINIC | Facility: CLINIC | Age: 55
End: 2025-04-09

## 2025-04-09 NOTE — TELEPHONE ENCOUNTER
Patient states she was prescribed medication for Bacterial Vaginosis and believes medication is  not working. Please call.

## 2025-04-09 NOTE — TELEPHONE ENCOUNTER
Izabel notified of recommendations for Uberlube and keep us update. Patient verbalized understanding.

## 2025-04-09 NOTE — TELEPHONE ENCOUNTER
Izabel called and informed of Dr. Cruz recommendations. She idd make and appointment to be see in May. She states she did try water based [place] when she attempted intercourse this past Saturday and it didn't work. She is wondering if this could be d/t the bacterial vaginosis and possibly not fully treated. She denies odor, itching or discharge. Informed since she has indicated an issue in the past, and lack of s/s would not feel this is r/t bacterial vaginosis. She is asking if Dr. Cruz has any further recommendations on a different lubrication to use.     To Dr. Cruz, please advise. Thank you.

## 2025-04-09 NOTE — TELEPHONE ENCOUNTER
Izabel called, she was treated with Metrogel for +bacterial vaginosis on 2/13. She states she used the medication as prescribed.     States when she had intercourse with  after medication, she states it was very painful and dry so he was unable to penetrate the vagina. She also states  feels the walls of the vagina on one side are \"thicker\".  She does indicate she has had \"uncomfortable\" intercourse in the past, but never this painful. We did discuss using water based lubrication. She is worried about family hx of cancer; mother had uterine cancer and sister needed a hysterectomy. We did review appointment options, she is asking to be seen     She is asking if Dr. Cruz would be willing to see her before his next opening which is May 8th. Informed message will be routed to Dr. Cruz.     To Dr. Cruz, please review and advise. Thank you.

## 2025-04-09 NOTE — TELEPHONE ENCOUNTER
Those aren't cancer related issues and doesn't warrant urgent visit.  She should use lubrications with IC first

## 2025-05-06 ENCOUNTER — APPOINTMENT (OUTPATIENT)
Dept: GENERAL RADIOLOGY | Facility: HOSPITAL | Age: 55
End: 2025-05-06
Attending: EMERGENCY MEDICINE
Payer: MEDICARE

## 2025-05-06 ENCOUNTER — HOSPITAL ENCOUNTER (INPATIENT)
Facility: HOSPITAL | Age: 55
LOS: 3 days | Discharge: HOME | End: 2025-05-09
Attending: EMERGENCY MEDICINE | Admitting: HOSPITALIST
Payer: MEDICARE

## 2025-05-06 DIAGNOSIS — E87.6 HYPOKALEMIA: ICD-10-CM

## 2025-05-06 DIAGNOSIS — E83.39 HYPOPHOSPHATEMIA: ICD-10-CM

## 2025-05-06 DIAGNOSIS — T50.905A ADVERSE EFFECT OF DRUG, INITIAL ENCOUNTER: ICD-10-CM

## 2025-05-06 DIAGNOSIS — F11.90 CHRONIC, CONTINUOUS USE OF OPIOIDS: ICD-10-CM

## 2025-05-06 DIAGNOSIS — F31.64 BIPOLAR AFFECTIVE DISORDER, MIXED, SEVERE, WITH PSYCHOTIC BEHAVIOR (HCC): ICD-10-CM

## 2025-05-06 DIAGNOSIS — R11.0 INTRACTABLE NAUSEA: Primary | ICD-10-CM

## 2025-05-06 DIAGNOSIS — R25.1 TREMOR: ICD-10-CM

## 2025-05-06 PROBLEM — F11.93 OPIATE WITHDRAWAL (HCC): Status: ACTIVE | Noted: 2025-05-06

## 2025-05-06 LAB
ALBUMIN SERPL-MCNC: 4.7 G/DL (ref 3.2–4.8)
ALBUMIN/GLOB SERPL: 1.6 {RATIO} (ref 1–2)
ALP LIVER SERPL-CCNC: 134 U/L (ref 41–108)
ALT SERPL-CCNC: 33 U/L (ref 10–49)
AMPHET UR QL SCN: NEGATIVE
ANION GAP SERPL CALC-SCNC: 13 MMOL/L (ref 0–18)
AST SERPL-CCNC: 27 U/L (ref ?–34)
ATRIAL RATE: 56 BPM
BARBITURATES UR QL SCN: NEGATIVE
BASOPHILS # BLD AUTO: 0.08 X10(3) UL (ref 0–0.2)
BASOPHILS NFR BLD AUTO: 0.4 %
BENZODIAZ UR QL SCN: NEGATIVE
BILIRUB SERPL-MCNC: 0.5 MG/DL (ref 0.3–1.2)
BILIRUB UR QL: NEGATIVE
BUN BLD-MCNC: 15 MG/DL (ref 9–23)
BUN/CREAT SERPL: 12.5 (ref 10–20)
CALCIUM BLD-MCNC: 11.5 MG/DL (ref 8.7–10.4)
CANNABINOIDS UR QL SCN: NEGATIVE
CHLORIDE SERPL-SCNC: 103 MMOL/L (ref 98–112)
CK SERPL-CCNC: 33 U/L (ref 34–145)
CLARITY UR: CLEAR
CO2 SERPL-SCNC: 25 MMOL/L (ref 21–32)
CREAT BLD-MCNC: 1.2 MG/DL (ref 0.55–1.02)
CREAT UR-SCNC: 46.4 MG/DL
DEPRECATED RDW RBC AUTO: 43.3 FL (ref 35.1–46.3)
EGFRCR SERPLBLD CKD-EPI 2021: 54 ML/MIN/1.73M2 (ref 60–?)
EOSINOPHIL # BLD AUTO: 0.29 X10(3) UL (ref 0–0.7)
EOSINOPHIL NFR BLD AUTO: 1.4 %
ERYTHROCYTE [DISTWIDTH] IN BLOOD BY AUTOMATED COUNT: 13.2 % (ref 11–15)
ETHANOL SERPL-MCNC: <3 MG/DL (ref ?–3)
GLOBULIN PLAS-MCNC: 3 G/DL (ref 2–3.5)
GLUCOSE BLD-MCNC: 148 MG/DL (ref 70–99)
GLUCOSE BLDC GLUCOMTR-MCNC: 145 MG/DL (ref 70–99)
GLUCOSE UR-MCNC: NORMAL MG/DL
HCT VFR BLD AUTO: 45.8 % (ref 35–48)
HGB BLD-MCNC: 15.1 G/DL (ref 12–16)
HGB UR QL STRIP.AUTO: NEGATIVE
IMM GRANULOCYTES # BLD AUTO: 0.12 X10(3) UL (ref 0–1)
IMM GRANULOCYTES NFR BLD: 0.6 %
KETONES UR-MCNC: NEGATIVE MG/DL
LEUKOCYTE ESTERASE UR QL STRIP.AUTO: NEGATIVE
LYMPHOCYTES # BLD AUTO: 5.9 X10(3) UL (ref 1–4)
LYMPHOCYTES NFR BLD AUTO: 28.9 %
MAGNESIUM SERPL-MCNC: 2.1 MG/DL (ref 1.6–2.6)
MCH RBC QN AUTO: 29.9 PG (ref 26–34)
MCHC RBC AUTO-ENTMCNC: 33 G/DL (ref 31–37)
MCV RBC AUTO: 90.7 FL (ref 80–100)
MDMA UR QL SCN: NEGATIVE
METHADONE UR QL SCN: NEGATIVE
MONOCYTES # BLD AUTO: 1.11 X10(3) UL (ref 0.1–1)
MONOCYTES NFR BLD AUTO: 5.4 %
NEUTROPHILS # BLD AUTO: 12.94 X10 (3) UL (ref 1.5–7.7)
NEUTROPHILS # BLD AUTO: 12.94 X10(3) UL (ref 1.5–7.7)
NEUTROPHILS NFR BLD AUTO: 63.3 %
NITRITE UR QL STRIP.AUTO: NEGATIVE
OSMOLALITY SERPL CALC.SUM OF ELEC: 296 MOSM/KG (ref 275–295)
P AXIS: 25 DEGREES
P-R INTERVAL: 220 MS
PCP UR QL SCN: NEGATIVE
PH UR: 8 [PH] (ref 5–8)
PHOSPHATE SERPL-MCNC: 0.5 MG/DL (ref 2.4–5.1)
PLATELET # BLD AUTO: 457 10(3)UL (ref 150–450)
POTASSIUM SERPL-SCNC: 2.3 MMOL/L (ref 3.5–5.1)
PROT SERPL-MCNC: 7.7 G/DL (ref 5.7–8.2)
PROT UR-MCNC: NEGATIVE MG/DL
Q-T INTERVAL: 470 MS
QRS DURATION: 100 MS
QTC CALCULATION (BEZET): 453 MS
R AXIS: 37 DEGREES
RBC # BLD AUTO: 5.05 X10(6)UL (ref 3.8–5.3)
SODIUM SERPL-SCNC: 141 MMOL/L (ref 136–145)
SP GR UR STRIP: 1.01 (ref 1–1.03)
T AXIS: 10 DEGREES
UROBILINOGEN UR STRIP-ACNC: NORMAL
VENTRICULAR RATE: 56 BPM
WBC # BLD AUTO: 20.4 X10(3) UL (ref 4–11)

## 2025-05-06 PROCEDURE — 71045 X-RAY EXAM CHEST 1 VIEW: CPT | Performed by: EMERGENCY MEDICINE

## 2025-05-06 PROCEDURE — 99223 1ST HOSP IP/OBS HIGH 75: CPT | Performed by: HOSPITALIST

## 2025-05-06 RX ORDER — ZOLPIDEM TARTRATE 10 MG/1
10 TABLET ORAL NIGHTLY
COMMUNITY
End: 2025-05-09

## 2025-05-06 RX ORDER — PROCHLORPERAZINE EDISYLATE 5 MG/ML
5 INJECTION INTRAMUSCULAR; INTRAVENOUS EVERY 8 HOURS PRN
Status: DISCONTINUED | OUTPATIENT
Start: 2025-05-06 | End: 2025-05-09

## 2025-05-06 RX ORDER — SUMATRIPTAN 50 MG/1
100 TABLET, FILM COATED ORAL DAILY PRN
Status: DISCONTINUED | OUTPATIENT
Start: 2025-05-06 | End: 2025-05-09

## 2025-05-06 RX ORDER — DROPERIDOL 2.5 MG/ML
0.62 INJECTION, SOLUTION INTRAMUSCULAR; INTRAVENOUS ONCE
Status: COMPLETED | OUTPATIENT
Start: 2025-05-06 | End: 2025-05-06

## 2025-05-06 RX ORDER — PRAVASTATIN SODIUM 40 MG
40 TABLET ORAL NIGHTLY
COMMUNITY

## 2025-05-06 RX ORDER — BUPRENORPHINE 8 MG/1
8 TABLET SUBLINGUAL ONCE
Status: COMPLETED | OUTPATIENT
Start: 2025-05-06 | End: 2025-05-06

## 2025-05-06 RX ORDER — SODIUM CHLORIDE 9 MG/ML
INJECTION, SOLUTION INTRAVENOUS CONTINUOUS
Status: DISCONTINUED | OUTPATIENT
Start: 2025-05-06 | End: 2025-05-08

## 2025-05-06 RX ORDER — ROPINIROLE 0.25 MG/1
0.25 TABLET, FILM COATED ORAL NIGHTLY
Status: DISCONTINUED | OUTPATIENT
Start: 2025-05-06 | End: 2025-05-09

## 2025-05-06 RX ORDER — LITHIUM CARBONATE 300 MG/1
300 CAPSULE ORAL NIGHTLY
Status: DISCONTINUED | OUTPATIENT
Start: 2025-05-06 | End: 2025-05-07

## 2025-05-06 RX ORDER — CLONAZEPAM 1 MG/1
2 TABLET ORAL EVERY MORNING
COMMUNITY
End: 2025-05-09

## 2025-05-06 RX ORDER — ONDANSETRON 2 MG/ML
4 INJECTION INTRAMUSCULAR; INTRAVENOUS EVERY 6 HOURS PRN
Status: DISCONTINUED | OUTPATIENT
Start: 2025-05-06 | End: 2025-05-09

## 2025-05-06 RX ORDER — SUMATRIPTAN SUCCINATE 100 MG/1
100 TABLET ORAL DAILY PRN
COMMUNITY

## 2025-05-06 RX ORDER — FENTANYL 100 UG/1
1 PATCH TRANSDERMAL ONCE
Refills: 0 | Status: COMPLETED | OUTPATIENT
Start: 2025-05-06 | End: 2025-05-09

## 2025-05-06 RX ORDER — LORAZEPAM 2 MG/ML
1 INJECTION INTRAMUSCULAR ONCE
Status: COMPLETED | OUTPATIENT
Start: 2025-05-06 | End: 2025-05-06

## 2025-05-06 RX ORDER — CLONAZEPAM 1 MG/1
2 TABLET ORAL EVERY MORNING
Status: DISCONTINUED | OUTPATIENT
Start: 2025-05-07 | End: 2025-05-07

## 2025-05-06 RX ORDER — CYCLOBENZAPRINE HCL 10 MG
10 TABLET ORAL 3 TIMES DAILY PRN
Status: DISCONTINUED | OUTPATIENT
Start: 2025-05-06 | End: 2025-05-09

## 2025-05-06 RX ORDER — ENOXAPARIN SODIUM 100 MG/ML
40 INJECTION SUBCUTANEOUS DAILY
Status: DISCONTINUED | OUTPATIENT
Start: 2025-05-07 | End: 2025-05-09

## 2025-05-06 RX ORDER — MORPHINE SULFATE 30 MG/1
30 TABLET, FILM COATED, EXTENDED RELEASE ORAL 3 TIMES DAILY
COMMUNITY

## 2025-05-06 RX ORDER — FENTANYL 100 UG/1
1 PATCH TRANSDERMAL EVERY OTHER DAY
Status: DISCONTINUED | OUTPATIENT
Start: 2025-05-08 | End: 2025-05-09

## 2025-05-06 RX ORDER — OXYCODONE HYDROCHLORIDE 10 MG/1
10 TABLET ORAL 4 TIMES DAILY PRN
COMMUNITY

## 2025-05-06 RX ORDER — CLONAZEPAM 1 MG/1
1 TABLET ORAL EVERY EVENING
Status: DISCONTINUED | OUTPATIENT
Start: 2025-05-06 | End: 2025-05-07

## 2025-05-06 RX ORDER — CLONAZEPAM 1 MG/1
1 TABLET ORAL EVERY EVENING
COMMUNITY
End: 2025-05-09

## 2025-05-06 RX ORDER — POTASSIUM CHLORIDE 1500 MG/1
40 TABLET, EXTENDED RELEASE ORAL ONCE
Status: DISCONTINUED | OUTPATIENT
Start: 2025-05-06 | End: 2025-05-06

## 2025-05-06 RX ORDER — OXYCODONE HYDROCHLORIDE 5 MG/1
10 TABLET ORAL 4 TIMES DAILY PRN
Status: DISCONTINUED | OUTPATIENT
Start: 2025-05-06 | End: 2025-05-09

## 2025-05-06 RX ORDER — ZOLPIDEM TARTRATE 5 MG/1
10 TABLET ORAL NIGHTLY
Status: DISCONTINUED | OUTPATIENT
Start: 2025-05-06 | End: 2025-05-07

## 2025-05-06 RX ORDER — ACETAMINOPHEN 500 MG
500 TABLET ORAL EVERY 4 HOURS PRN
Status: DISCONTINUED | OUTPATIENT
Start: 2025-05-06 | End: 2025-05-09

## 2025-05-06 RX ORDER — DULOXETIN HYDROCHLORIDE 60 MG/1
60 CAPSULE, DELAYED RELEASE ORAL DAILY
Status: DISCONTINUED | OUTPATIENT
Start: 2025-05-07 | End: 2025-05-07

## 2025-05-06 RX ORDER — DROPERIDOL 2.5 MG/ML
1.25 INJECTION, SOLUTION INTRAMUSCULAR; INTRAVENOUS ONCE
Status: COMPLETED | OUTPATIENT
Start: 2025-05-06 | End: 2025-05-06

## 2025-05-06 RX ORDER — MORPHINE SULFATE 15 MG/1
30 TABLET, FILM COATED, EXTENDED RELEASE ORAL 3 TIMES DAILY
Status: DISCONTINUED | OUTPATIENT
Start: 2025-05-06 | End: 2025-05-09

## 2025-05-06 RX ORDER — LORAZEPAM 2 MG/ML
2 INJECTION INTRAMUSCULAR EVERY 4 HOURS PRN
Status: DISCONTINUED | OUTPATIENT
Start: 2025-05-06 | End: 2025-05-09

## 2025-05-06 NOTE — CM/SW NOTE
SW received MDO for PHQ 4. PHQ 4 placed in AVS.     SW/CM to remain available for support and/or discharge planning.     Almita Velazquez, MSW, LSW

## 2025-05-06 NOTE — DISCHARGE INSTRUCTIONS
Depression and Anxiety   There is often a link between how someone is feeling physically and their emotional wellbeing. It isn’t unusual to experience depression or anxiety after a physical illness, major surgery or traumatic situation. It also isn’t unusual for someone dealing with anxiety or depression to develop other physical symptoms. The following tables will give you some comparisons between the physical and emotional symptoms of depression and anxiety.  Depression  Mood disorders like depression are quite common. It is not unusual for someone who is depressed to experience both physical and emotional symptoms. A primary care physician may help you understand if your physical symptoms are related to a recent physical illness, stress, emotional turmoil, or an unexpected trauma.  Physical Symptoms:   * Changes in appetite Changes in sleep patterns,Fatigue, Persistent unexplained aches and pains, Headaches, Chest pain, Digestive problems  Behavioral Symptoms:   * Sadness, Increased irritability, Easily frustrated, Low self-esteem, Loss of interest in, pleasurable activities, Poor concentration, Suicidal thoughts  Anxiety  Stress and anxiety go hand in hand. From generalized anxiety to phobic disorders and panic attacks, these behavioral issues can cause both physical and emotional symptoms. Your feelings of anxiety could be caused by a recent physical illness, stress, emotional distress or feelings associated with an unexpected trauma. As with other mental illnesses, especially anxiety disorders, allow your physician to determine the cause of your physical health symptoms.  Physical Symptoms:   * Trouble falling or staying asleep, Heart palpitations, Trembling, Irritability, Sweating/flushing, Frequent urination or diarrhea, Being easily startled  Behavioral Symptoms:   * Persistent state of apprehension or fear, Feelings of dread without valid cause, Irritability or edginess, Intense/sudden feelings of panic or  doom,    Feelings of detachment and unreality, Catastrophic thinking, Hyper-vigilance towards signs of danger  We’re here to help  Once the cause of your physical symptoms has been determined, your physician may recommend you see a psychiatrist, psychologist or a counselor for additional support. [facility] offers a free and confidential behavioral health assessment and specialized services at our locations in [facility] and [place].  For more information, call the [facility] [place] Help Line, available , at [phone] or visit   [email or web address].    Behavioral Health:    You were seen by Psychiatry while in the hospital. At this time, it is recommended that you continue your care with outpatient mental health services. Below are referrals for psychiatry and counseling. Please contact the providers listed to schedule your initial appointment and ensure continuity of care.     [facility] - multiple locations   Phone: [phone]  *VIRTUAL CARE OPTIONS AVAILABLE     [facility] [address] [place], [place], [address]  Phone: [phone]    [facility]  [address] [place] [address], [address] [address]  Phone [phone]    [facility]  [address], [address], [place], [place] [address]  Phone [phone]    [facility]   [address] [place], [place], [address]  Phone: [phone]    [facility]  [address], [address]  [place], [place] [address]  [phone]    Eileen Couch MD  [phone]  [address], [address]  [place], [place] [address]    Stephon Meek MD  [address], [address]  [place], [place] [address]  [phone]    Hieu Fay NP   [facility]   [phone] [address] [place], [place] [address]    Veena Carrizales NP  [facility]   [phone]  [address], [place] [address]    If you are experiencing a mental health crisis, please call 911 or go to your nearest ED. If you are not in immediate danger but require emotional support and/or assistance, please contact: [facility] at [phone] or [facility] at [phone].

## 2025-05-06 NOTE — ED INITIAL ASSESSMENT (HPI)
Pt to ED via EMS from home for possible medication reaction. Per EMS, pt took the first dose of her Lybalvi this morning at 715am. Then she developed some lightheadedness and involuntary movements. Medication is for treatment of depression and BPD. Endorses using cocaine yesterday. Also reports having placed 2 fentanyl patches instead of prescribed 1 yesterday. But only 1 patch noted on patient at this time, 2nd patch seen in bed sheets.

## 2025-05-06 NOTE — H&P
[facility]    PATIENT'S NAME: CEDRICK LINDSEY   ATTENDING PHYSICIAN: Tomasa Barton MD   PATIENT ACCOUNT#:   [id number]    LOCATION:  [address] [place] [address]  MEDICAL RECORD #:   [id number]       YOB: 1970  ADMISSION DATE:       05/06/2025    HISTORY AND PHYSICAL EXAMINATION    CHIEF COMPLAINT:  Opiate withdrawal.    HISTORY OF PRESENT ILLNESS:  The patient is a 54-year-old  female with chronic pain syndrome and fibromyalgia, usually on fentanyl patch 100 mcg every third day and oxycodone 10 mg 3 to 4 tablets a day.  She was initiated on olanzapine plus samidorphan tablet by her psychiatrist and took the first dose today.  After that, she was supposed to put her fentanyl patch on, and she went into severe opiates withdrawal within an hour with large diarrhea, abdominal cramps, and severe restlessness.  Family brought her into the emergency department for evaluation.  CBC showed white blood cell count of 20.4.  Differential is still pending.  Potassium 2.3, phosphorus 0.5, and magnesium 2.1.  Calcium 11.1 and GFR is 53 which is below her baseline.  EKG shows subtle changes of sinus syndrome and hypokalemia.  She had a fentanyl patch placed in the emergency room, also was given IV lorazepam, potassium, and potassium phosphate compound, and she will be admitted to the hospital for further management.    PAST MEDICAL HISTORY:  Chronic fibromyalgia and chronic pain syndrome on large doses of opiates at home including Duragesic patch 100 mcg every third day and oxycodone 10 mg 3 to 4 times a day.  She has history of depression, bipolar affective disorder, anxiety, manic behavior, osteoarthritis.    PAST SURGICAL HISTORY:  She had brain craniotomy for brain aneurysm repair per her report, hip open reduction and internal fixation after a fall and fracture, right knee patellar fracture repair, foot surgery.    MEDICATIONS:  Please see medication reconciliation list.    ALLERGIES:  No known drug  allergies.    FAMILY HISTORY:  Mother had hypertension and endometrial cancer.  Father had dementia and prostate cancer.    SOCIAL HISTORY:  Chronic tobacco use.  No alcohol or drug use.    REVIEW OF SYSTEMS:  Patient said she had abdominal cramps, large diarrheal bowel movements earlier today, restlessness, and generalized sense of agitation and severe anxiety, muscle twitches.  Other 12-point review of systems is negative.      PHYSICAL EXAMINATION:    GENERAL:  Restless and very unkempt.  VITAL SIGNS:  Temperature 97.4, pulse 66, respiratory rate 26, blood pressure 149/98, pulse ox 99% on room air.  HEENT:  Atraumatic.  Oropharynx clear.  Dry mucous membranes.  Ears, nose normal.  Eyes:  Anicteric sclerae.  NECK:  Supple.  No lymphadenopathy.  Trachea midline.  LUNGS:  Clear to auscultation bilaterally.  Normal respiratory effort.  HEART:  Regular rate, rhythm.  S1, S2 auscultated.  No murmur.  ABDOMEN:  Soft, nondistended.  No tenderness.  EXTREMITIES:  No peripheral edema, clubbing, or cyanosis.  NEUROLOGIC:  Motor and sensory intact.    ASSESSMENT:    1.   Severe opiate withdrawal secondary to samidorphan.  She received only dose with olanzapine, first dose this morning.  2.   Hypokalemia and severe hypophosphatemia.  3.   Leukocytosis, possible reactive, rule out underlying infectious process.  Urinalysis is still pending.    PLAN:  Patient had Duragesic patch placed in the emergency room.  We will give her also 1 dose of buprenorphine 8 mg.  Half life of samidorphan is 7 to 9 hours.  We will continue IV Ativan as needed for agitation.  Replace electrolytes.  Follow up on urinalysis.  Obtain Psychiatry consult.  Fall precautions.  Further recommendations to follow.    Dictated By Tomasa Barton MD  d: 05/06/2025 13:58:46  t: 05/06/2025 16:23:44  Job [id number]  FB/

## 2025-05-06 NOTE — ED PROVIDER NOTES
Patient Seen in: [facility] Emergency Department    History     Chief Complaint   Patient presents with    Medication Reaction     Stated Complaint:  reaction/tremor    HPI    54-year-old female with past medical history of fibromyalgia/chronic pain on 100 mcg transdermal fentanyl, bipolar with recent initiation lybalvi presneting with complaints of tremors/diarrhea/generalized weakness this morning for which EMS called. No trauma/fall. No other new meds. (+) cocaine, denies alcohol. No other new meds/dosage change. No prior history of similar.  to bedside, noting patient to be due for new patch.    Past Medical History[1]    Past Surgical History[2]         Family History[3]    Short Social Hx on File[4]    Review of Systems :  Constitutional: As per HPI  Eyes: Negative for discharge and visual disturbance.   Respiratory: Negative for cough and shortness of breath.    Cardiovascular: Negative for chest pain and palpitations.   Gastrointestinal: Negative for vomiting and abdominal pain; (+) diarrhea.  Genitourinary: Negative for dysuria and hematuria.     Positive for stated complaint:   Other systems are as noted in HPI.  Constitutional and vital signs reviewed.      All other systems reviewed and negative except as noted above.    PSFH elements reviewed from today and agreed except as otherwise stated in HPI.    Physical Exam     ED Triage Vitals [05/06/25 1147]   /53   Pulse 67   Resp 16   Temp 97.4 °F (36.3 °C)   Temp src Temporal   SpO2 95 %   O2 Device None (Room air)       Current:/53   Pulse 67   Temp 97.4 °F (36.3 °C) (Temporal)   Resp 16   Wt 63.5 kg   SpO2 95%   BMI 27.34 kg/m²         Physical Exam   Constitutional: Anxious, tremulous.  HEENT: MM.  Head: Normocephalic.   Eyes: No injection. Pupils mydriatic and equally reactive. No photophobia.  Neck: Neck supple. No meningismus.  Cardiovascular: RRR.   Pulmonary/Chest: Effort normal. CTAB.  Abdominal: Soft.  Nontender.  Musculoskeletal: No gross deformity.  Neurological: Alert. CN II-XII grossly intact. BUE/BLE proximally and distally with 5/5 strength.  Skin: Skin is warm.   Psychiatric: Cooperative, anxious.  Nursing note and vitals reviewed.        ED Course     Labs Reviewed   CBC WITH DIFFERENTIAL WITH PLATELET - Abnormal; Notable for the following components:       Result Value    WBC 20.4 (*)     .0 (*)     Neutrophil Absolute Prelim 12.94 (*)     All other components within normal limits   POCT GLUCOSE - Abnormal; Notable for the following components:    POC Glucose  145 (*)     All other components within normal limits   COMP METABOLIC PANEL (14)   SCAN SLIDE   MD BLOOD SMEAR CONSULT   RAINBOW DRAW LAVENDER   RAINBOW DRAW LIGHT GREEN   RAINBOW DRAW BLUE   RAINBOW DRAW GOLD     EKG    Rate, intervals and axes as noted on EKG Report.  Rate: 56  Rhythm: Sinus Rhythm  Reading: sinus ambar 56 without MARLINE as independently interpreted by myself         XR CHEST AP PORTABLE  (CPT=71045)  Result Date: 5/6/2025  PROCEDURE: XR CHEST AP PORTABLE  (CPT=71045)  COMPARISON: None.  INDICATIONS: Leukocytosis. Cough.  Findings and impression:  Normal heart size, clear lungs, normal pleura Finalized by (CST): Mickey Moody MD on 5/06/2025 at 1:54 PM            ED Course as of 05/06/25 1403  ------------------------------------------------------------  Time: 05/06 1312  Comment: Nausea ongoing/persisting - plan admission for ongoing management.       MDM   DIFFERENTIAL DIAGNOSIS: After history and physical exam differential diagnosis includes but is not limited to electrolyte derangement, opioid withdrawal, adverse medication effect, rhabdomyolysis.    Pulse ox: 95%:Normal on RA, as independently interpreted by myself    Cardiac Monitor Interpretation:   Pulse Readings from Last 1 Encounters:   05/06/25 67   , sinus,      Medical Decision Making  Evaluation for tremor/diarrhea/diffuse pain in setting of lybalvi initiation  in patient chronically prescribed on oxycodone/morphine and fentanyl patch with consideration for iatrogenic opioid withdrawal - labs with hypokalemia/hypophosphatemia with ongoing nausea/vomiting despite parenteral meds/antiemetics; will admit for ongoing management with buprenorphine and parenteral potassium phosphate repletion initiated; case d/w on call medicine Dr. Barton for admission with psychiatry on consult.    Problems Addressed:  Adverse effect of drug, initial encounter: acute illness or injury  Chronic, continuous use of opioids: chronic illness or injury  Hypokalemia: acute illness or injury  Hypophosphatemia: acute illness or injury  Intractable nausea: acute illness or injury  Tremor: acute illness or injury    Amount and/or Complexity of Data Reviewed  Independent Historian: spouse     Details: Collateral history obtained from  at bedside  External Data Reviewed: labs.     Details: 10/2022 BMP reviewed  Labs: ordered. Decision-making details documented in ED Course.  Radiology: ordered and independent interpretation performed. Decision-making details documented in ED Course.     Details: CXR without obvious pneumothorax as independently interpreted by myself    ECG/medicine tests: ordered and independent interpretation performed. Decision-making details documented in ED Course.  Discussion of management or test interpretation with external provider(s): Case d/w on call medicine Dr. Barton for admission    Risk  Prescription drug management.  Decision regarding hospitalization.    Critical Care  Total time providing critical care: 31 minutes        A total of 31 minutes of critical care time (exclusive of billable procedures) was administered to manage the patient's critical lab values and metabolic instability due to her hypokalemia/hypophosphatemia.  This involved direct patient intervention, complex decision making, and/or extensive discussions with the patient, family, and clinical  staff.      I was wearing at minimum a facemask and eye protection throughout this encounter with handwashing performed prior and after patient evaluation without personal hand/facial/oropharyngeal contact and gloves worn throughout encounter. See note and/or contact this provider for further PPE details.    Disposition and Plan     Clinical Impression:  1. Intractable nausea    2. Adverse effect of drug, initial encounter    3. Chronic, continuous use of opioids    4. Tremor    5. Hypokalemia    6. Hypophosphatemia        Disposition:  Admit    Follow-up:  No follow-up provider specified.    Medications Prescribed:  Current Discharge Medication List                   [1]   Past Medical History:   Anxiety    Bipolar 1 disorder (HCC)    Birth control    \"birth control tabs\"    Brain aneurysm (HCC)    Depression    Fibromyalgia    chronic pain    Hip fracture (HCC)    femur pinning, pelvis    Hyperlipidemia    Manic behavior (HCC)    Multiple falls    Wound dehiscence    R knee, Debridement R knee wound, flap closure   [2]   Past Surgical History:  Procedure Laterality Date    Brain surgery  2008    aneurysm    Foot surgery      Fracture surgery      Knee surgery  4/22/2014    Debridement R knee wound, flap closure    Pelvis/hip joint surgery unlisted      femur pinning, pelvis   [3]   Family History  Problem Relation Age of Onset    Hypertension Mother     Uterine Cancer Mother     Prostate Cancer Father     Dementia Father    [4]   Social History  Socioeconomic History    Marital status:    Tobacco Use    Smoking status: Every Day     Current packs/day: 0.50     Average packs/day: 0.5 packs/day for 28.0 years (14.0 ttl pk-yrs)     Types: Cigarettes    Smokeless tobacco: Never   Vaping Use    Vaping status: Never Used   Substance and Sexual Activity    Alcohol use: No     Alcohol/week: 0.0 standard drinks of alcohol    Drug use: Yes     Types: Cocaine    Sexual activity: Yes   Other Topics Concern    Caffeine  Concern No

## 2025-05-06 NOTE — ED QUICK NOTES
Patient still visibly uncomfortable. Per Dr. Melendez, holding PO and IV K+ until patient is less uncomfortable/restless     Orders for admission, patient is aware of plan and ready to go upstairs. Any questions, please call ED RN Loren at extension [phone].     Patient Covid vaccination status: Fully vaccinated     COVID Test Ordered in ED: None    COVID Suspicion at Admission: N/A    Running Infusions: Medication Infusions[1]     Mental Status/LOC at time of transport: a&ox4    Other pertinent information:   CIWA score: N/A   NIH score:  N/A             [1]

## 2025-05-07 PROBLEM — F31.64 BIPOLAR AFFECTIVE DISORDER, MIXED, SEVERE, WITH PSYCHOTIC BEHAVIOR (HCC): Status: ACTIVE | Noted: 2025-05-07

## 2025-05-07 LAB
ANION GAP SERPL CALC-SCNC: 8 MMOL/L (ref 0–18)
BASOPHILS # BLD AUTO: 0.05 X10(3) UL (ref 0–0.2)
BASOPHILS NFR BLD AUTO: 0.3 %
BUN BLD-MCNC: 7 MG/DL (ref 9–23)
BUN/CREAT SERPL: 8.4 (ref 10–20)
CALCIUM BLD-MCNC: 9.8 MG/DL (ref 8.7–10.4)
CHLORIDE SERPL-SCNC: 109 MMOL/L (ref 98–112)
CO2 SERPL-SCNC: 25 MMOL/L (ref 21–32)
CREAT BLD-MCNC: 0.83 MG/DL (ref 0.55–1.02)
DEPRECATED RDW RBC AUTO: 43.5 FL (ref 35.1–46.3)
EGFRCR SERPLBLD CKD-EPI 2021: 84 ML/MIN/1.73M2 (ref 60–?)
EOSINOPHIL # BLD AUTO: 0.04 X10(3) UL (ref 0–0.7)
EOSINOPHIL NFR BLD AUTO: 0.2 %
ERYTHROCYTE [DISTWIDTH] IN BLOOD BY AUTOMATED COUNT: 13.3 % (ref 11–15)
GLUCOSE BLD-MCNC: 114 MG/DL (ref 70–99)
HCT VFR BLD AUTO: 41 % (ref 35–48)
HGB BLD-MCNC: 13.7 G/DL (ref 12–16)
IMM GRANULOCYTES # BLD AUTO: 0.07 X10(3) UL (ref 0–1)
IMM GRANULOCYTES NFR BLD: 0.4 %
LYMPHOCYTES # BLD AUTO: 3.08 X10(3) UL (ref 1–4)
LYMPHOCYTES NFR BLD AUTO: 18.9 %
MCH RBC QN AUTO: 30.2 PG (ref 26–34)
MCHC RBC AUTO-ENTMCNC: 33.4 G/DL (ref 31–37)
MCV RBC AUTO: 90.3 FL (ref 80–100)
MONOCYTES # BLD AUTO: 0.98 X10(3) UL (ref 0.1–1)
MONOCYTES NFR BLD AUTO: 6 %
NEUTROPHILS # BLD AUTO: 12.09 X10 (3) UL (ref 1.5–7.7)
NEUTROPHILS # BLD AUTO: 12.09 X10(3) UL (ref 1.5–7.7)
NEUTROPHILS NFR BLD AUTO: 74.2 %
OSMOLALITY SERPL CALC.SUM OF ELEC: 293 MOSM/KG (ref 275–295)
PLATELET # BLD AUTO: 379 10(3)UL (ref 150–450)
POTASSIUM SERPL-SCNC: 2.4 MMOL/L (ref 3.5–5.1)
POTASSIUM SERPL-SCNC: 2.9 MMOL/L (ref 3.5–5.1)
POTASSIUM SERPL-SCNC: 2.9 MMOL/L (ref 3.5–5.1)
RBC # BLD AUTO: 4.54 X10(6)UL (ref 3.8–5.3)
SODIUM SERPL-SCNC: 142 MMOL/L (ref 136–145)
WBC # BLD AUTO: 16.3 X10(3) UL (ref 4–11)

## 2025-05-07 PROCEDURE — 99233 SBSQ HOSP IP/OBS HIGH 50: CPT | Performed by: FAMILY MEDICINE

## 2025-05-07 PROCEDURE — 90792 PSYCH DIAG EVAL W/MED SRVCS: CPT | Performed by: OTHER

## 2025-05-07 RX ORDER — ARIPIPRAZOLE 2 MG/1
2 TABLET ORAL DAILY
Status: DISCONTINUED | OUTPATIENT
Start: 2025-05-07 | End: 2025-05-08

## 2025-05-07 RX ORDER — QUETIAPINE FUMARATE 25 MG/1
50 TABLET, FILM COATED ORAL NIGHTLY
Status: DISCONTINUED | OUTPATIENT
Start: 2025-05-07 | End: 2025-05-09

## 2025-05-07 RX ORDER — HALOPERIDOL 5 MG/ML
2 INJECTION INTRAMUSCULAR EVERY 6 HOURS PRN
Status: DISCONTINUED | OUTPATIENT
Start: 2025-05-07 | End: 2025-05-09

## 2025-05-07 RX ORDER — ZOLPIDEM TARTRATE 5 MG/1
5 TABLET ORAL NIGHTLY PRN
Status: DISCONTINUED | OUTPATIENT
Start: 2025-05-07 | End: 2025-05-09

## 2025-05-07 RX ORDER — LITHIUM CARBONATE 300 MG/1
300 CAPSULE ORAL 2 TIMES DAILY WITH MEALS
Status: DISCONTINUED | OUTPATIENT
Start: 2025-05-08 | End: 2025-05-09

## 2025-05-07 RX ORDER — POTASSIUM CHLORIDE 14.9 MG/ML
20 INJECTION INTRAVENOUS ONCE
Status: COMPLETED | OUTPATIENT
Start: 2025-05-08 | End: 2025-05-08

## 2025-05-07 RX ORDER — CLONAZEPAM 1 MG/1
1 TABLET ORAL 2 TIMES DAILY
Status: DISCONTINUED | OUTPATIENT
Start: 2025-05-07 | End: 2025-05-09

## 2025-05-07 RX ORDER — DULOXETIN HYDROCHLORIDE 30 MG/1
30 CAPSULE, DELAYED RELEASE ORAL DAILY
Status: DISCONTINUED | OUTPATIENT
Start: 2025-05-08 | End: 2025-05-08

## 2025-05-07 NOTE — PLAN OF CARE
Problem: Patient Centered Care  Goal: Patient preferences are identified and integrated in the patient's plan of care  Description: Interventions:- Provide timely, complete, and accurate information to patient/family- Incorporate patient and family knowledge, values, beliefs, and cultural backgrounds into the planning and delivery of care- Encourage patient/family to participate in care and decision-making at the level they choose- Honor patient and family perspectives and choices  Outcome: Progressing     Problem: PAIN - ADULT  Goal: Verbalizes/displays adequate comfort level or patient's stated pain goal  Description: INTERVENTIONS:- Encourage pt to monitor pain and request assistance- Assess pain using appropriate pain scale- Administer analgesics based on type and severity of pain and evaluate response- Implement non-pharmacological measures as appropriate and evaluate response- Consider cultural and social influences on pain and pain management- Manage/alleviate anxiety- Utilize distraction and/or relaxation techniques- Monitor for opioid side effects- Notify MD/LIP if interventions unsuccessful or patient reports new pain- Anticipate increased pain with activity and pre-medicate as appropriate  Outcome: Progressing     Problem: SAFETY ADULT - FALL  Goal: Free from fall injury  Description: INTERVENTIONS:- Assess pt frequently for physical needs- Identify cognitive and physical deficits and behaviors that affect risk of falls.- [place] fall precautions as indicated by assessment.- Educate pt/family on patient safety including physical limitations- Instruct pt to call for assistance with activity based on assessment- Modify environment to reduce risk of injury- Provide assistive devices as appropriate- Consider OT/PT consult to assist with strengthening/mobility- Encourage toileting schedule  Outcome: Progressing

## 2025-05-07 NOTE — PAYOR COMM NOTE
--------------  ADMISSION REVIEW     Payor: [employer] PPO  Subscriber #:  [id number]  Authorization Number: [id number]    Admit date: 5/6/25  Admit time:  2:47 PM       REVIEW DOCUMENTATION:     ED Provider Notes        ED Provider Notes signed by Jaun Melendez MD at 5/6/2025 10:10 PM      Patient Seen in: [facility] Emergency Department    History     Chief Complaint   Patient presents with    Medication Reaction     Stated Complaint:  reaction/tremor    HPI    54-year-old female with past medical history of fibromyalgia/chronic pain on 100 mcg transdermal fentanyl, bipolar with recent initiation lybalvi presneting with complaints of tremors/diarrhea/generalized weakness this morning for which EMS called. No trauma/fall. No other new meds. (+) cocaine, denies alcohol. No other new meds/dosage change. No prior history of similar.  to bedside, noting patient to be due for new patch.    Review of Systems :  Constitutional: As per HPI  Eyes: Negative for discharge and visual disturbance.   Respiratory: Negative for cough and shortness of breath.    Cardiovascular: Negative for chest pain and palpitations.   Gastrointestinal: Negative for vomiting and abdominal pain; (+) diarrhea.  Genitourinary: Negative for dysuria and hematuria.     Positive for stated complaint:   Other systems are as noted in HPI.  Constitutional and vital signs reviewed.      All other systems reviewed and negative except as noted above.    PSFH elements reviewed from today and agreed except as otherwise stated in HPI.    Physical Exam     ED Triage Vitals [05/06/25 1147]   /53   Pulse 67   Resp 16   Temp 97.4 °F (36.3 °C)   Temp src Temporal   SpO2 95 %   O2 Device None (Room air)       Current:/53   Pulse 67   Temp 97.4 °F (36.3 °C) (Temporal)   Resp 16   Wt 63.5 kg   SpO2 95%   BMI 27.34 kg/m²         Physical Exam   Constitutional: Anxious, tremulous.  HEENT: MM.  Head: Normocephalic.   Eyes: No  injection. Pupils mydriatic and equally reactive. No photophobia.  Neck: Neck supple. No meningismus.  Cardiovascular: RRR.   Pulmonary/Chest: Effort normal. CTAB.  Abdominal: Soft. Nontender.  Musculoskeletal: No gross deformity.  Neurological: Alert. CN II-XII grossly intact. BUE/BLE proximally and distally with 5/5 strength.  Skin: Skin is warm.   Psychiatric: Cooperative, anxious.  Nursing note and vitals reviewed.        ED Course     Labs Reviewed   CBC WITH DIFFERENTIAL WITH PLATELET - Abnormal; Notable for the following components:       Result Value    WBC 20.4 (*)     .0 (*)     Neutrophil Absolute Prelim 12.94 (*)     All other components within normal limits   POCT GLUCOSE - Abnormal; Notable for the following components:    POC Glucose  145 (*)     All other components within normal limits   COMP METABOLIC PANEL (14)   SCAN SLIDE   MD BLOOD SMEAR CONSULT   RAINBOW DRAW LAVENDER   RAINBOW DRAW LIGHT GREEN   RAINBOW DRAW BLUE   RAINBOW DRAW GOLD     EKG    Rate, intervals and axes as noted on EKG Report.  Rate: 56  Rhythm: Sinus Rhythm  Reading: sinus ambar 56 without MARLINE as independently interpreted by myself         XR CHEST AP PORTABLE  (CPT=71045)  Result Date: 5/6/2025  PROCEDURE: XR CHEST AP PORTABLE  (CPT=71045)  COMPARISON: None.  INDICATIONS: Leukocytosis. Cough.  Findings and impression:  Normal heart size, clear lungs, normal pleura Finalized by (CST): Mickey Moody MD on 5/06/2025 at 1:54 PM            ED Course as of 05/06/25 1403  ------------------------------------------------------------  Time: 05/06 1312  Comment: Nausea ongoing/persisting - plan admission for ongoing management.       MDM   DIFFERENTIAL DIAGNOSIS: After history and physical exam differential diagnosis includes but is not limited to electrolyte derangement, opioid withdrawal, adverse medication effect, rhabdomyolysis.    Pulse ox: 95%:Normal on RA, as independently interpreted by myself    Cardiac Monitor  Interpretation:   Pulse Readings from Last 1 Encounters:   05/06/25 67   , sinus,      Medical Decision Making  Evaluation for tremor/diarrhea/diffuse pain in setting of lybalvi initiation in patient chronically prescribed on oxycodone/morphine and fentanyl patch with consideration for iatrogenic opioid withdrawal - labs with hypokalemia/hypophosphatemia with ongoing nausea/vomiting despite parenteral meds/antiemetics; will admit for ongoing management with buprenorphine and parenteral potassium phosphate repletion initiated; case d/w on call medicine Dr. Barton for admission with psychiatry on consult.    Problems Addressed:  Adverse effect of drug, initial encounter: acute illness or injury  Chronic, continuous use of opioids: chronic illness or injury  Hypokalemia: acute illness or injury  Hypophosphatemia: acute illness or injury  Intractable nausea: acute illness or injury  Tremor: acute illness or injury    Amount and/or Complexity of Data Reviewed  Independent Historian: spouse     Details: Collateral history obtained from  at bedside  External Data Reviewed: labs.     Details: 10/2022 BMP reviewed  Labs: ordered. Decision-making details documented in ED Course.  Radiology: ordered and independent interpretation performed. Decision-making details documented in ED Course.     Details: CXR without obvious pneumothorax as independently interpreted by myself    ECG/medicine tests: ordered and independent interpretation performed. Decision-making details documented in ED Course.  Discussion of management or test interpretation with external provider(s): Case d/w on call medicine Dr. Barton for admission    Risk  Prescription drug management.  Decision regarding hospitalization.    Critical Care  Total time providing critical care: 31 minutes        A total of 31 minutes of critical care time (exclusive of billable procedures) was administered to manage the patient's critical lab values and metabolic  instability due to her hypokalemia/hypophosphatemia.  This involved direct patient intervention, complex decision making, and/or extensive discussions with the patient, family, and clinical staff.      I was wearing at minimum a facemask and eye protection throughout this encounter with handwashing performed prior and after patient evaluation without personal hand/facial/oropharyngeal contact and gloves worn throughout encounter. See note and/or contact this provider for further PPE details.    Disposition and Plan     Clinical Impression:  1. Intractable nausea    2. Adverse effect of drug, initial encounter    3. Chronic, continuous use of opioids    4. Tremor    5. Hypokalemia    6. Hypophosphatemia        Disposition:  Admit        Signed by Jaun Melendez MD on 5/6/2025 10:10 PM       History and Physical    H&P signed by Tomasa Barton MD at 5/7/2025  6:06 AM       [facility]     PATIENT'S NAME: CEDRICK LINDSEY   ATTENDING PHYSICIAN: Tomasa Barton MD   PATIENT ACCOUNT#:   [id number]    LOCATION:  [address] [place] [address]  MEDICAL RECORD #:   [id number]       YOB: 1970  ADMISSION DATE:       05/06/2025     HISTORY AND PHYSICAL EXAMINATION     CHIEF COMPLAINT:  Opiate withdrawal.     HISTORY OF PRESENT ILLNESS:  The patient is a 54-year-old  female with chronic pain syndrome and fibromyalgia, usually on fentanyl patch 100 mcg every third day and oxycodone 10 mg 3 to 4 tablets a day.  She was initiated on olanzapine plus samidorphan tablet by her psychiatrist and took the first dose today.  After that, she was supposed to put her fentanyl patch on, and she went into severe opiates withdrawal within an hour with large diarrhea, abdominal cramps, and severe restlessness.  Family brought her into the emergency department for evaluation.  CBC showed white blood cell count of 20.4.  Differential is still pending.  Potassium 2.3, phosphorus 0.5, and magnesium 2.1.  Calcium 11.1 and GFR  is 53 which is below her baseline.  EKG shows subtle changes of sinus syndrome and hypokalemia.  She had a fentanyl patch placed in the emergency room, also was given IV lorazepam, potassium, and potassium phosphate compound, and she will be admitted to the hospital for further management.     ASSESSMENT:    1.       Severe opiate withdrawal secondary to samidorphan.  She received only dose with olanzapine, first dose this morning.  2.       Hypokalemia and severe hypophosphatemia.  3.       Leukocytosis, possible reactive, rule out underlying infectious process.  Urinalysis is still pending.     PLAN:  Patient had Duragesic patch placed in the emergency room.  We will give her also 1 dose of buprenorphine 8 mg.  Half life of samidorphan is 7 to 9 hours.  We will continue IV Ativan as needed for agitation.  Replace electrolytes.  Follow up on urinalysis.  Obtain Psychiatry consult.  Fall precautions.  Further recommendations to follow.     Dictated By Tomasa Barton MD              5/7          Date of Service: 5/7/2025  9:45 AM     Signed            Progress Note        Chief Complaint: patient presented with       Chief Complaint   Patient presents with    Medication Reaction         Subjective:   S: Patient denies any complaints     Review of Systems:   10 point ROS completed and was negative, except for pertinent positive and negatives stated in subjective.     Objective:   Vital signs:  Temp:  [97.4 °F (36.3 °C)-99.7 °F (37.6 °C)] 98.9 °F (37.2 °C)  Pulse:  [62-81] 80  Resp:  [16-26] 18  BP: ()/() 143/72  SpO2:  [92 %-99 %] 99 %         Wt Readings from Last 6 Encounters:   05/06/25 147 lb (66.7 kg)   02/13/25 149 lb 12.8 oz (67.9 kg)   06/13/23 172 lb 9.6 oz (78.3 kg)   09/18/22 169 lb 4.8 oz (76.8 kg)   02/26/21 175 lb (79.4 kg)   02/11/21 173 lb (78.5 kg)            Physical Exam:    General: No acute distress. Alert ,         Respiratory: Clear to auscultation bilaterally. No wheezes. No  rhonchi.  Cardiovascular: S1, S2. Regular rate and rhythm. No murmurs, rubs or gallops.   Abdomen: Soft, nontender, nondistended.  Positive bowel sounds. No rebound or guarding.  Neurologic: No focal neurological deficits.   Musculoskeletal: Moves all extremities.  Extremities: No edema.     Results:   Diagnostic Data:       Labs:     Labs Last 24 Hours:                      BMP         CBC       Other      Na 142 Cl 109 BUN 7 Glu 114     Hb 13.7     PTT - Procal -    K 2.4 CO2 25.0 Cr 0.83     WBC 16.3 >< .0   INR - CRP -    Renal Lytes Endo       Hct 41.0     Trop - D dim -    eGFR - Ca 9.8 POC Gluc  145       LFT     pBNP - Lactic -    eGFR AA - PO4 0.5 A1c -     AST 27 APk 134 Prot 7.7   LDL -        Mg 2.1 TSH -     ALT 33 T иван 0.5 Alb 4.7               Creatinine Kinase      Recent Labs   Lab 05/06/25  1150   CK 33*         [Scheduled Medications]   potassium phosphate dibasic 30 mmol in sodium chloride 0.9% 250 mL IVPB  30 mmol Intravenous Once    potassium phosphate dibasic 15 mmol in sodium chloride 0.9% 250 mL IVPB  15 mmol Intravenous Once    fentaNYL  1 patch Transdermal Once    enoxaparin  40 mg Subcutaneous Daily    clonazePAM  2 mg Oral QAM    clonazePAM  1 mg Oral QPM    DULoxetine  60 mg Oral Daily    [START ON 5/8/2025] fentaNYL  1 patch Transdermal QOD    lithium carbonate  300 mg Oral Nightly    morphINE ER  30 mg Oral TID    rOPINIRole  0.25 mg Oral Nightly    zolpidem  10 mg Oral Nightly        Assessment & Plan:   ASSESSMENT / PLAN:         Severe opioid withdrawal secondary to Samidorphan  History of chronic fibromyalgia and chronic pain syndrome    Supportive care  Continue Klonopin  At home on large doses of opioids     Electrolyte imbalance  Continue electrolyte protocol     Leukocytosis  UA is negative for any infection  Chest x-ray is clear  Follow cbc in am      History of depression  Bipolar affective disorder  History of anxiety  Continue lithium  Continue Cymbalta             Plan of care discussed with Patient and RN.      Coordinated care with providers and counseling re: treatment plan and workup     Kenyatta Walls MD     Supplementary Documentation:            **Certification        PHYSICIAN Certification of Need for Inpatient Hospitalization - Initial Certification     Patient will require inpatient services that will reasonably be expected to span two midnight's based on the clinical documentation in H+P.   Based on patients current state of illness, I anticipate that, after discharge, patient will require TBD.                   Phosphorus [[id number]] (Abnormal)  Resulted: 05/06/25 1318, Result status: Final result  Ordering provider: Jaun Melendez MD  05/06/25 1245 Resulting lab: [facility] ([facility])   Specimen Information    Type Source Collected On   Blood -- 05/06/25 1150     Components    Component Value Reference Range Flag Lab   Phosphorus 0.5 2.4 - 5.1 mg/dL LL Low Panic  [facility] ([facility])     Comp Metabolic Panel (14) [[id number]] (Abnormal)  Resulted: 05/06/25 1232, Result status: Final result  Ordering provider: Jaun Melendez MD  05/06/25 1148 Resulting lab: [facility] ([facility])   Specimen Information    Type Source Collected On   Blood -- 05/06/25 1150     Components    Component Value Reference Range Flag Lab   Glucose 148 70 - 99 mg/dL H High  [facility] ([facility])   Sodium 141 136 - 145 mmol/L -- [facility] ([facility])   Potassium 2.3 3.5 - 5.1 mmol/L LL Low Panic  [facility] ([facility])   Chloride 103 98 - 112 mmol/L -- [facility] ([facility])   CO2 25.0 21.0 - 32.0 mmol/L -- [facility] ([facility])   Anion Gap 13 0 - 18 mmol/L -- [facility] ([facility])   BUN 15 9 - 23 mg/dL -- [facility] ([facility])   Creatinine 1.20 0.55 - 1.02 mg/dL H High  [facility] ([facility])   BUN/CREA Ratio 12.5 10.0 - 20.0 -- [facility] ([facility])   Calcium, Total 11.5 8.7 - 10.4 mg/dL H High  [facility] ([facility])   Calculated Osmolality 296 275 - 295 mOsm/kg H High   [facility] ([facility])   eGFR-Cr 54 >=60 mL/min/1.73m2 L Low  [facility] ([facility])   Comment: eGRF calculated using the CKD-EPI 2021 calculation   ALT 33 10 - 49 U/L -- [facility] ([facility])   AST 27 <34 U/L -- [facility] ([facility])   Alkaline Phosphatase 134 41 - 108 U/L H High  [facility] ([facility])   Bilirubin, Total 0.5 0.3 - 1.2 mg/dL -- [facility] ([facility])   Total Protein 7.7 5.7 - 8.2 g/dL -- [facility] ([facility])   Albumin 4.7 3.2 - 4.8 g/dL -- [facility] ([facility])   Globulin 3.0 2.0 - 3.5 g/dL -- [facility] ([facility])   A/G Ratio 1.6 1.0 - 2.0 -- [facility] ([facility])       MEDICATIONS ADMINISTERED IN LAST 1 DAY:  ARIPiprazole (Abilify) tab 2 mg       Date Action Dose Route User    5/7/2025 1346 Given 2 mg Oral Dwayne Gu RN          buprenorphine (SUBUTEX) SL tab 8 mg       Date Action Dose Route User    5/6/2025 1414 Given 8 mg Sublingual Loren Vela RN          clonazePAM (KlonoPIN) tab 2 mg       Date Action Dose Route User    5/7/2025 0819 Given 2 mg Oral Dwayne Gu RN          clonazePAM (KlonoPIN) tab 1 mg       Date Action Dose Route User    5/6/2025 1834 Given 1 mg Oral Dwayne Gu RN          cyclobenzaprine (Flexeril) tab 10 mg       Date Action Dose Route User    5/6/2025 2319 Given 10 mg Oral Pop Whitehead RN          DULoxetine (Cymbalta) DR cap 60 mg       Date Action Dose Route User    5/7/2025 0818 Given 60 mg Oral Dwayne Gu RN          enoxaparin (Lovenox) 40 MG/0.4ML SUBQ injection 40 mg       Date Action Dose Route User    5/7/2025 0818 Given 40 mg Subcutaneous (Right Lower Abdomen) Dwayne Gu RN          haloperidol lactate (Haldol) 5 MG/ML injection 2 mg       Date Action Dose Route User    5/7/2025 1346 Given 2 mg Intravenous Dwayne Gu, RN          lactated ringers IV bolus 1,000 mL       Date Action Dose Route User    5/6/2025 1422 Rate/Dose Change (none) Intravenous Loren Vela, ELENA          lithium carbonate cap 300 mg       Date Action Dose Route User     5/6/2025 2016 Given 300 mg Oral Pop Whitehead RN          LORazepam (Ativan) 2 mg/mL injection 2 mg       Date Action Dose Route User    5/7/2025 0255 Given 2 mg Intravenous Pop Whitehead RN    5/6/2025 1515 Given 2 mg Intravenous Gertrude Herring RN          morphINE ER (MS Contin) tab 30 mg       Date Action Dose Route User    5/7/2025 0818 Given 30 mg Oral Dwayne Gu RN    5/6/2025 1953 Given 30 mg Oral Pop Whitehead RN    5/6/2025 1514 Given 30 mg Oral Gertrude Herring RN          ondansetron (Zofran) 4 MG/2ML injection 4 mg       Date Action Dose Route User    5/7/2025 0445 Given 4 mg Intravenous Pop Whitehead RN    5/6/2025 1834 Given 4 mg Intravenous Dwayne Gu RN          oxyCODONE immediate release tab 10 mg       Date Action Dose Route User    5/6/2025 2323 Given 10 mg Oral Pop Whitehead RN    5/6/2025 1923 Given 10 mg Oral Dwayne Gu RN          potassium and sodium phosphates (Neutra-Phos) 280-160-250 MG oral powder 1 packet       Date Action Dose Route User    5/6/2025 1425 Given 1 packet Oral Loren Vela RN          potassium phosphate dibasic 15 mmol in sodium chloride 0.9% 250 mL IVPB       Date Action Dose Route User    5/6/2025 1426 Rate/Dose Change (none) Intravenous Loren Vela RN          potassium phosphate dibasic 30 mmol in sodium chloride 0.9% 250 mL IVPB       Date Action Dose Route User    5/7/2025 0811 New Bag 30 mmol Intravenous Dwayne Gu RN          potassium phosphate dibasic 15 mmol in sodium chloride 0.9% 250 mL IVPB       Date Action Dose Route User    5/7/2025 1352 New Bag 15 mmol Intravenous Dwayne Gu RN          prochlorperazine (Compazine) 10 MG/2ML injection 5 mg       Date Action Dose Route User    5/6/2025 2004 Given 5 mg Intravenous Pop Whitehead RN          rOPINIRole (Requip) tab 0.25 mg       Date Action Dose Route User    5/6/2025 2016 Given 0.25 mg Oral Whitehead, Pop, RN          sodium chloride 0.9% infusion        Date Action Dose Route User    5/6/2025 1504 New Bag (none) Intravenous Gertrude Herring, RN          zolpidem (Ambien) tab 10 mg       Date Action Dose Route User    5/6/2025 2320 Given 10 mg Oral Pop Whitehead, RN            Completed Medications   Medications 04/28 04/29 04/30 05/01 05/02 05/03 05/04 05/05 05/06 05/07   buprenorphine (SUBUTEX) SL tab 8 mg  Dose: 8 mg  Freq: Once Route: SL  Start: 05/06/25 1343 End: 05/06/25 1414   Admin Instructions:   ** SUBLINGUAL ** route of administration. Autosub for Suboxone  After the medicine is completely dissolved, take a large sip of water, swish it gently around your teeth and gums, and swallow. You should wait at least 1 hour before brushing your teeth to avoid damage to your teeth and give your mouth a chance to return to its natural state.            [id number]         droperidol (Inapsine) 2.5 mg/mL injection 0.625 mg  Dose: 0.625 mg  Freq: Once Route: IV  Start: 05/06/25 1211 End: 05/06/25 1224            [id number]         droperidol (Inapsine) 2.5 mg/mL injection 1.25 mg  Dose: 1.25 mg  Freq: Once Route: IV  Start: 05/06/25 1313 End: 05/06/25 1317            [id number]         lactated ringers IV bolus 1,000 mL  Dose: 1,000 mL  Freq: Once Route: IV  Start: 05/06/25 1240 End: 05/06/25 1347            [id number]     1422 [C]29         LORazepam (Ativan) 2 mg/mL injection 1 mg  Dose: 1 mg  Freq: Once Route: IV  Start: 05/06/25 1211 End: 05/06/25 1223   Admin Instructions:   For IV use dilute 1:1 with saline            [id number]         potassium and sodium phosphates (Neutra-Phos) 280-160-250 MG oral powder 1 packet  Dose: 1 packet  Freq: Once Route: OR  Start: 05/06/25 1321 End: 05/06/25 1425   Admin Instructions:   Each packet contains 8 mmol Phosphorus, 7.1 mEq Sodium, and 7.1 mEq Potassium            1425 [C]31         potassium phosphate dibasic 15 mmol in sodium chloride 0.9% 250 mL IVPB  Dose: 15 mmol  Freq: Once Route: IV  Start: 05/06/25 1321 End: 05/06/25 1806             [id number]     1426 [C]33     [id number]         potassium phosphate dibasic 30 mmol in sodium chloride 0.9% 250 mL IVPB  Dose: 30 mmol  Freq: Once Route: IV  Start: 05/07/25 0745 End: 05/07/25 1211   Admin Instructions:   Administer through peripheral line  Give total of 45 mmol Potassium Phosphate (this is Dose 1 of 2: 30 mmol dose x1 over 4 hours)             [id number]        Discontinued Medications   Medications 04/28 04/29 04/30 05/01 05/02 05/03 05/04 05/05 05/06 05/07   clonazePAM (KlonoPIN) tab 1 mg  Dose: 1 mg  Freq: Every evening Route: OR  Start: 05/06/25 1800 End: 05/07/25 1312   Admin Instructions:   CAUTION: REPRODUCTIVE RISK            [id number]         clonazePAM (KlonoPIN) tab 2 mg  Dose: 2 mg  Freq: Every morning Route: OR  Start: 05/07/25 0900 End: 05/07/25 1312   Admin Instructions:   CAUTION: REPRODUCTIVE RISK             [id number]                                                              Medications 04/28 04/29 04/30 05/01 05/02 05/03 05/04 05/05 05/06 05/07   Administration Detail       Vitals (last day)       Date/Time Temp Pulse Resp BP SpO2 Weight O2 Device O2 Flow Rate (L/min) [facility]    05/07/25 0807 98.9 °F (37.2 °C) 80 18 143/72 99 % -- None (Room air) -- TT    05/07/25 0313 97.6 °F (36.4 °C) -- -- 131/65 95 % -- Nasal cannula 5 L/min     05/07/25 0242 99.6 °F (37.6 °C) -- -- 90/72 94 % -- Nasal cannula 5 L/min GV    05/06/25 2004 -- 81 -- -- -- -- -- -- KD    05/06/25 1950 98.4 °F (36.9 °C) -- 18 148/68 92 % -- None (Room air) -- GV    05/06/25 1838 -- 71 -- -- -- -- -- -- RW    05/06/25 1754 99.7 °F (37.6 °C) 66 20 154/63 99 % -- None (Room air) -- TT    05/06/25 1452 -- -- -- -- -- 147 lb (66.7 kg) -- -- AB    05/06/25 1450 -- 62 22 -- 99 % -- None (Room air) -- AB    05/06/25 1430 -- 62 22 126/81 99 % -- None (Room air) -- SW    05/06/25 1415 -- 62 20 92/67 99 % -- None (Room air) --     05/06/25 1330 -- 66 26 149/98 99 % -- None (Room air) --     05/06/25 1315 -- 64 23 111/98 99 % -- None  (Room air) --     05/06/25 1247 -- 68 22 162/132 95 % -- None (Room air) --     05/06/25 1147 97.4 °F (36.3 °C) 67 16 119/53 95 % 140 lb (63.5 kg) None (Room air) --           CIWA Scores (since admission)       None

## 2025-05-07 NOTE — PROGRESS NOTES
Progress Note     Izabel Martin Patient Status:  Inpatient    1970 MRN [id number]   Location [facility] 5SW/SE Attending Kenyatta Walls MD   Hosp Day # 1 PCP Anay Gould DO     Chief Complaint: patient presented with   Chief Complaint   Patient presents with    Medication Reaction       Subjective:   S: Patient denies any complaints    Review of Systems:   10 point ROS completed and was negative, except for pertinent positive and negatives stated in subjective.    Objective:   Vital signs:  Temp:  [97.4 °F (36.3 °C)-99.7 °F (37.6 °C)] 98.9 °F (37.2 °C)  Pulse:  [62-81] 80  Resp:  [16-26] 18  BP: ()/() 143/72  SpO2:  [92 %-99 %] 99 %    Wt Readings from Last 6 Encounters:   25 147 lb (66.7 kg)   25 149 lb 12.8 oz (67.9 kg)   23 172 lb 9.6 oz (78.3 kg)   22 169 lb 4.8 oz (76.8 kg)   21 175 lb (79.4 kg)   21 173 lb (78.5 kg)         Physical Exam:    General: No acute distress. Alert ,         Respiratory: Clear to auscultation bilaterally. No wheezes. No rhonchi.  Cardiovascular: S1, S2. Regular rate and rhythm. No murmurs, rubs or gallops.   Abdomen: Soft, nontender, nondistended.  Positive bowel sounds. No rebound or guarding.  Neurologic: No focal neurological deficits.   Musculoskeletal: Moves all extremities.  Extremities: No edema.    Results:   Diagnostic Data:      Labs:    Labs Last 24 Hours:   BMP     CBC    Other     Na 142 Cl 109 BUN 7 Glu 114   Hb 13.7   PTT - Procal -   K 2.4 CO2 25.0 Cr 0.83   WBC 16.3 >< .0  INR - CRP -   Renal Lytes Endo    Hct 41.0   Trop - D dim -   eGFR - Ca 9.8 POC Gluc  145    LFT   pBNP - Lactic -   eGFR AA - PO4 0.5 A1c -   AST 27 APk 134 Prot 7.7  LDL -     Mg 2.1 TSH -   ALT 33 T иван 0.5 Alb 4.7        COVID-19 Lab Results    COVID-19  Lab Results   Component Value Date    COVID19 Not Detected 2022    COVID19 Not Detected 2021       Pro-Calcitonin  No results for input(s): \"PCT\" in  the last 168 hours.    Cardiac  No results for input(s): \"TROP\", \"PBNP\" in the last 168 hours.    Creatinine Kinase  Recent Labs   Lab 05/06/25  1150   CK 33*       Inflammatory Markers  No results for input(s): \"CRP\", \"MOI\", \"LDH\", \"DDIMER\" in the last 168 hours.    Imaging: Imaging data reviewed in Epic.    Medications: Scheduled Medications[1]    Assessment & Plan:   ASSESSMENT / PLAN:       Severe opioid withdrawal secondary to Samidorphan  History of chronic fibromyalgia and chronic pain syndrome  Psych consulted  Supportive care  Continue Klonopin  At home on large doses of opioids    Electrolyte imbalance  Continue electrolyte protocol    Leukocytosis  UA is negative for any infection  Chest x-ray is clear  Follow cbc in am     History of depression  Bipolar affective disorder  History of anxiety  Continue lithium  Continue Cymbalta      Quality:  DVT Prophylaxis: Lovenox   CODE status: FULL   DISPO: pending clinical improvement.   Estimated date of discharge: To be determined  Discharge is dependent on: Improved clinical status  At this point Patient is expected to be discharge to: Home versus rehab      Plan of care discussed with Patient and RN.     Coordinated care with providers and counseling re: treatment plan and workup     Kenyatta Walls MD    Supplementary Documentation:         **Certification      PHYSICIAN Certification of Need for Inpatient Hospitalization - Initial Certification    Patient will require inpatient services that will reasonably be expected to span two midnight's based on the clinical documentation in H+P.   Based on patients current state of illness, I anticipate that, after discharge, patient will require TBD.    I personally reviewed the available laboratories, imaging including operative report. I discussed/will discuss the case with patient and her nurse. I ordered laboratories studies. I adjusted medications including not applicable today. Medical decision making high, risk  is high.     >55min spent, >50% spent counseling and coordinating care in the form of educating pt/family and d/w consultants and staff. Most of the time spent discussing the above plan.                 [1]    potassium phosphate dibasic 30 mmol in sodium chloride 0.9% 250 mL IVPB  30 mmol Intravenous Once    potassium phosphate dibasic 15 mmol in sodium chloride 0.9% 250 mL IVPB  15 mmol Intravenous Once    fentaNYL  1 patch Transdermal Once    enoxaparin  40 mg Subcutaneous Daily    clonazePAM  2 mg Oral QAM    clonazePAM  1 mg Oral QPM    DULoxetine  60 mg Oral Daily    [START ON 5/8/2025] fentaNYL  1 patch Transdermal QOD    lithium carbonate  300 mg Oral Nightly    morphINE ER  30 mg Oral TID    rOPINIRole  0.25 mg Oral Nightly    zolpidem  10 mg Oral Nightly

## 2025-05-07 NOTE — PLAN OF CARE
Problem: PAIN - ADULT  Goal: Verbalizes/displays adequate comfort level or patient's stated pain goal  Description: INTERVENTIONS:- Encourage pt to monitor pain and request assistance- Assess pain using appropriate pain scale- Administer analgesics based on type and severity of pain and evaluate response- Implement non-pharmacological measures as appropriate and evaluate response- Consider cultural and social influences on pain and pain management- Manage/alleviate anxiety- Utilize distraction and/or relaxation techniques- Monitor for opioid side effects- Notify MD/LIP if interventions unsuccessful or patient reports new pain- Anticipate increased pain with activity and pre-medicate as appropriate  Outcome: Not Progressing     Problem: SAFETY ADULT - FALL  Goal: Free from fall injury  Description: INTERVENTIONS:- Assess pt frequently for physical needs- Identify cognitive and physical deficits and behaviors that affect risk of falls.- [place] fall precautions as indicated by assessment.- Educate pt/family on patient safety including physical limitations- Instruct pt to call for assistance with activity based on assessment- Modify environment to reduce risk of injury- Provide assistive devices as appropriate- Consider OT/PT consult to assist with strengthening/mobility- Encourage toileting schedule  Outcome: Progressing

## 2025-05-08 LAB
ANION GAP SERPL CALC-SCNC: 6 MMOL/L (ref 0–18)
BASOPHILS # BLD AUTO: 0.08 X10(3) UL (ref 0–0.2)
BASOPHILS NFR BLD AUTO: 0.7 %
BUN BLD-MCNC: <5 MG/DL (ref 9–23)
CALCIUM BLD-MCNC: 8.9 MG/DL (ref 8.7–10.4)
CHLORIDE SERPL-SCNC: 120 MMOL/L (ref 98–112)
CO2 SERPL-SCNC: 20 MMOL/L (ref 21–32)
CREAT BLD-MCNC: 0.74 MG/DL (ref 0.55–1.02)
DEPRECATED RDW RBC AUTO: 49.2 FL (ref 35.1–46.3)
EGFRCR SERPLBLD CKD-EPI 2021: 96 ML/MIN/1.73M2 (ref 60–?)
EOSINOPHIL # BLD AUTO: 0.29 X10(3) UL (ref 0–0.7)
EOSINOPHIL NFR BLD AUTO: 2.4 %
ERYTHROCYTE [DISTWIDTH] IN BLOOD BY AUTOMATED COUNT: 14 % (ref 11–15)
GLUCOSE BLD-MCNC: 118 MG/DL (ref 70–99)
HCT VFR BLD AUTO: 38.8 % (ref 35–48)
HGB BLD-MCNC: 12.1 G/DL (ref 12–16)
IMM GRANULOCYTES # BLD AUTO: 0.04 X10(3) UL (ref 0–1)
IMM GRANULOCYTES NFR BLD: 0.3 %
LYMPHOCYTES # BLD AUTO: 3.28 X10(3) UL (ref 1–4)
LYMPHOCYTES NFR BLD AUTO: 27.1 %
MCH RBC QN AUTO: 30 PG (ref 26–34)
MCHC RBC AUTO-ENTMCNC: 31.2 G/DL (ref 31–37)
MCV RBC AUTO: 96 FL (ref 80–100)
MONOCYTES # BLD AUTO: 0.91 X10(3) UL (ref 0.1–1)
MONOCYTES NFR BLD AUTO: 7.5 %
NEUTROPHILS # BLD AUTO: 7.51 X10 (3) UL (ref 1.5–7.7)
NEUTROPHILS # BLD AUTO: 7.51 X10(3) UL (ref 1.5–7.7)
NEUTROPHILS NFR BLD AUTO: 62 %
PHOSPHATE SERPL-MCNC: 2.4 MG/DL (ref 2.4–5.1)
PLATELET # BLD AUTO: 332 10(3)UL (ref 150–450)
POTASSIUM SERPL-SCNC: 3.8 MMOL/L (ref 3.5–5.1)
POTASSIUM SERPL-SCNC: 3.8 MMOL/L (ref 3.5–5.1)
RBC # BLD AUTO: 4.04 X10(6)UL (ref 3.8–5.3)
SODIUM SERPL-SCNC: 146 MMOL/L (ref 136–145)
WBC # BLD AUTO: 12.1 X10(3) UL (ref 4–11)

## 2025-05-08 PROCEDURE — 99233 SBSQ HOSP IP/OBS HIGH 50: CPT | Performed by: FAMILY MEDICINE

## 2025-05-08 PROCEDURE — 99233 SBSQ HOSP IP/OBS HIGH 50: CPT | Performed by: OTHER

## 2025-05-08 RX ORDER — ARIPIPRAZOLE 5 MG/1
5 TABLET ORAL DAILY
Status: DISCONTINUED | OUTPATIENT
Start: 2025-05-09 | End: 2025-05-09

## 2025-05-08 NOTE — PROGRESS NOTES
Patient is a 54 year old ,  female with history of bipolar disorder, chronic pain syndrome, fibromyalgia and history of MAYUR who presented to the hospital with intractable nausea and found with severe withdrawal symptom and confusion.    Consult Duration     The patient seen for over 50-minute, follow-up evaluation, over 50% counseling and coordinating care addressing severe opiate withdrawal after starting Lybalvi.  Record reviewed, communication with attending, communication with RN and patient seen face to face evaluation.     History of Present Illness:   Communicating with the team overnight, the patient was calmer and slept well.    Psychotropic medications given: Abilify 2 mg, Klonopin 1 mg, Duloxetine 30 mg, Seroquel 50 mg, Requip 0.25 mg, Lithium 300 mg    Labs and imaging reviewed: WBC 12.1    I visited the patient this morning. She was asleep with sitter present but awoke to her name when I entered the room.    She appears much better compared to yesterday. The patient was alert and oriented to person, place, time, and condition.  The patient was able to communicate and appears very cooperative and attentive.    I asked her if her mind felt clearer, to which she said \"oh yeah definitely\".     The patient indicated that recently has been going through difficult time emotionally and her mind has been all over the place.  Patient does believe the medication has messed her up.    The patient reporting that she has been few other psychiatrist in the past and has been with the current provider for short time.    The patient demonstrated desire to be discharged.  Patient otherwise acknowledge that her improvement has been very noticeable otherwise not there yet.    The patient today educated about her current psychiatric medications. I explained that I increased the lithium yesterday from once a day to twice due to the short half-life of lithium. I also told her that I decreased her duloxetine  from 60 mg to 30 mg with plans to stop it today in light of her bipolar disorder diagnosis. She was agreeable, stating that \"I cannot take the ups and downs\" because sometimes \"I just wig out\". The patient feels that these bipolar episodes are not fair to her , and I reminded her that it's not fair for her either and that this medication regimen should be helpful.     The patient asked if she could go home tonight, and I denied her request just to allow more time to sort out her psychiatric medications. I said I would discharge her tomorrow, and she was agreeable.     The patient denying any suicidal or homicidal ideation.  Patient denied any auditory visual hallucination.  The patient admitted intense mood swings recently with alternation in her behavior  And thought process.    The patient is improving after demonstrating a delirium episode with alternation in mood and cognition with episodes of  increased confusion, restlessness, agitation and response to internal stimuli in light of opiate withdrawal secondary to starting Lybalvi.     Past Psychiatric/Medication History:  1. Prior diagnoses: Bipolar disorder  2. Past psychiatric inpatient: Unknown  3. Past outpatient history: Nurse petitioner  4. Past suicide history: Denied any  5. Medication history: Lithium, allopurinol, duloxetine, Klonopin    Social History:   Patient is .  Lives with     Family History:  No psychiatric family history  Medical History:   Past Medical History  Past Medical History[1]    Past Surgical History  Past Surgical History[2]    Family History  Family History[3]    Social History  Short Social Hx on File[4]        Current Medications:  Current Hospital Medications[5]  Prescriptions Prior to Admission[6]    Allergies  Allergies[7]    Review of Systems:   As by Admitting/Attending    Results:   Laboratory Data:  Lab Results   Component Value Date    WBC 12.1 (H) 05/08/2025    HGB 12.1 05/08/2025    HCT 38.8  05/08/2025    .0 05/08/2025    CREATSERUM 0.74 05/08/2025    BUN <5 (L) 05/08/2025     (H) 05/08/2025    K 3.8 05/08/2025    K 3.8 05/08/2025     (H) 05/08/2025    CO2 20.0 (L) 05/08/2025     (H) 05/08/2025    CA 8.9 05/08/2025    ALB 4.7 05/06/2025    ALKPHO 134 (H) 05/06/2025    TP 7.7 05/06/2025    AST 27 05/06/2025    ALT 33 05/06/2025    TSH 3.18 01/17/2019    LIP 52 (L) 09/18/2022    MG 2.1 05/06/2025    PHOS 2.4 05/08/2025    CK 33 (L) 05/06/2025    ETOH <3 05/06/2025         Imaging:  No results found.    Vital Signs:   Blood pressure 134/53, pulse 78, temperature 98.1 °F (36.7 °C), temperature source Oral, resp. rate 18, weight 66.7 kg (147 lb), SpO2 97%.    Mental Status Exam:   Appearance: Stated age female, obese, laying in bed. Drowsy  Psychomotor: Patient today not demontrating agitation or retardation.  Orientation: Alert and oriented to person, place, time, and condition.  Gait: Not evaluated  Attitude/Coorperation: Cooperative and attentive.   Behavior: Appropriate  Speech: Regular rate and rhythm of speech  Mood: \"feel better\"  Affect: Slightly labile affect congruent with mood  Thought process: Linear and appropriate  Thought content: No reports of  suicidal or homicidal ideation.  Perceptions: Denies any auditory or visual hallucinations  Concentration: Grossly intact  Memory: Grossly intact.  Intellect: Average.  Judgment and Insight: Intact.  Patient demonstrated ability to understand her medical condition interact with the treatment plan.    Impression:     Bipolar mixed severe with psychotic feature.  Opiate withdrawal.  Opiate dependence, continuous.  Intractable nausea  Tremor  Hypokalemia  Hypophosphatemia    The patient is a 54-year-old   female with history of bipolar disorder who has been demonstrating recent episode of alternation in her mood and increased as anxiety, restlessness and difficulty sleeping.  The patient started by her APN on  Lybalvi which is combination of olanzapine and samidorphan.  Search addition to her narcotic medication when the patient on fentanyl patch and oxycodone has caused immediate withdrawal symptom.    The patient currently has been demonstrating mixed component of opiate withdrawal on superimposed on mixed bipolar episode, chronic pain and severe anxiety.  The patient currently has been demonstrating delirium episode with alternation in mood and cognition with episodes of  increased confusion, restlessness, agitation and response to internal stimuli.     5/8/2025: Patient has demonstrated significant improvement since yesterday. Will discontinue duloxetine. Otherwise medication regimen is unchanged. Plan for discharge home tomorrow.    Discussed risk and benefit, acknowledging the current symptom and severity.  At this point, I would recommend the following approach:     Focus on safety.    Focus on education and support.  Focus on insight orientation helping the patient understand diagnosis and treatment plan.  Discontinue Duloxetine.   Change lithium to 300 mg twice daily with meal.  Creatinine level is normal.  Increase Abilify to 5 mg daily.  Continue Seroquel 50 mg nightly.  Continue Klonopin to 1 mg twice daily.  Consider antiseizure medication.  Processed with patient at length, the initiation of the above psychotropic medications I advised the patient of the risks, benefits, alternatives and potential side effects. The patient consents to administration of the medications and understands the right to refuse medications at any time. The patient verbalized understanding.   Coordinate plan with team    Orders This Visit:  Orders Placed This Encounter   Procedures    CBC With Differential With Platelet    Comp Metabolic Panel (14)    Scan slide    MD BLOOD SMEAR CONSULT    CK (Creatine Kinase) (Not Creatinine)    Urinalysis with Culture Reflex    Drug Abuse Panel 11 screen    Ethyl Alcohol    Magnesium    Phosphorus     Basic Metabolic Panel (8)    CBC With Differential With Platelet    Phosphorus    Potassium    CBC With Differential With Platelet    Basic Metabolic Panel (8)    Potassium    Potassium    Basic Metabolic Panel (8)    Phosphorus    Clostridium difficile(toxigenic)PCR       Meds This Visit:  Requested Prescriptions      No prescriptions requested or ordered in this encounter       Genaro Patel MD  5/8/2025    Note to Patient: The 21st Century Cures Act makes medical notes like these available to patients in the interest of transparency. However, be advised this is a medical document. It is intended as peer to peer communication. It is written in medical language and may contain abbreviations or verbiage that are unfamiliar. It may appear blunt or direct. Medical documents are intended to carry relevant information, facts as evident, and the clinical opinion of the practitioner. This note may have been transcribed using a voice dictation system. Voice recognition errors may occur. This should not be taken to alter the content or meaning of this note.            [1]   Past Medical History:   Anxiety    Bipolar 1 disorder (HCC)    Birth control    \"birth control tabs\"    Brain aneurysm (HCC)    Depression    Fibromyalgia    chronic pain    Hip fracture (HCC)    femur pinning, pelvis    Hyperlipidemia    Manic behavior (HCC)    Multiple falls    Wound dehiscence    R knee, Debridement R knee wound, flap closure   [2]   Past Surgical History:  Procedure Laterality Date    Brain surgery  2008    aneurysm    Foot surgery      Fracture surgery      Knee surgery  4/22/2014    Debridement R knee wound, flap closure    Pelvis/hip joint surgery unlisted      femur pinning, pelvis   [3]   Family History  Problem Relation Age of Onset    Hypertension Mother     Uterine Cancer Mother     Prostate Cancer Father     Dementia Father    [4]   Social History  Socioeconomic History    Marital status:    Tobacco Use     Smoking status: Every Day     Current packs/day: 0.50     Average packs/day: 0.5 packs/day for 28.0 years (14.0 ttl pk-yrs)     Types: Cigarettes    Smokeless tobacco: Never   Vaping Use    Vaping status: Never Used   Substance and Sexual Activity    Alcohol use: No     Alcohol/week: 0.0 standard drinks of alcohol    Drug use: Yes     Types: Cocaine    Sexual activity: Yes   Other Topics Concern    Caffeine Concern No     Social Drivers of Health     Food Insecurity: No Food Insecurity (5/6/2025)    NCSS - Food Insecurity     Worried About Running Out of Food in the Last Year: No     Ran Out of Food in the Last Year: No   Transportation Needs: No Transportation Needs (5/6/2025)    NCSS - Transportation     Lack of Transportation: No   Housing Stability: Not At Risk (5/6/2025)    NCSS - Housing/Utilities     Has Housing: Yes     Worried About Losing Housing: No     Unable to Get Utilities: No   [5]   Current Facility-Administered Medications   Medication Dose Route Frequency    sodium phosphate 15 mmol in 0.9% NaCl 100mL IVPB premix  15 mmol Intravenous Once    [START ON 5/9/2025] ARIPiprazole (Abilify) tab 5 mg  5 mg Oral Daily    haloperidol lactate (Haldol) 5 MG/ML injection 2 mg  2 mg Intravenous Q6H PRN    clonazePAM (KlonoPIN) tab 1 mg  1 mg Oral BID    lithium carbonate cap 300 mg  300 mg Oral BID with meals    zolpidem (Ambien) tab 5 mg  5 mg Oral Nightly PRN    QUEtiapine (SEROquel) tab 50 mg  50 mg Oral Nightly    fentaNYL (Duragesic) 100 MCG/HR patch 1 patch  1 patch Transdermal Once    enoxaparin (Lovenox) 40 MG/0.4ML SUBQ injection 40 mg  40 mg Subcutaneous Daily    acetaminophen (Tylenol Extra Strength) tab 500 mg  500 mg Oral Q4H PRN    ondansetron (Zofran) 4 MG/2ML injection 4 mg  4 mg Intravenous Q6H PRN    prochlorperazine (Compazine) 10 MG/2ML injection 5 mg  5 mg Intravenous Q8H PRN    LORazepam (Ativan) 2 mg/mL injection 2 mg  2 mg Intravenous Q4H PRN    fentaNYL (Duragesic) 100 MCG/HR patch 1  patch  1 patch Transdermal QOD    morphINE ER (MS Contin) tab 30 mg  30 mg Oral TID    rOPINIRole (Requip) tab 0.25 mg  0.25 mg Oral Nightly    cyclobenzaprine (Flexeril) tab 10 mg  10 mg Oral TID PRN    oxyCODONE immediate release tab 10 mg  10 mg Oral QID PRN    SUMAtriptan (Imitrex) tab 100 mg  100 mg Oral Daily PRN   [6]   Medications Prior to Admission   Medication Sig    clonazePAM 1 MG Oral Tab Take 2 tablets (2 mg total) by mouth every morning.    clonazePAM 1 MG Oral Tab Take 1 tablet (1 mg total) by mouth every evening.    morphINE ER 30 MG Oral Tab CR Take 1 tablet (30 mg total) by mouth 3 (three) times daily.    oxyCODONE 10 MG Oral Tab Take 1 tablet (10 mg total) by mouth 4 (four) times daily as needed for Pain.    pravastatin 40 MG Oral Tab Take 1 tablet (40 mg total) by mouth nightly.    zolpidem 10 MG Oral Tab Take 1 tablet (10 mg total) by mouth nightly.    SUMAtriptan Succinate 100 MG Oral Tab Take 1 tablet (100 mg total) by mouth daily as needed.    rOPINIRole HCl 0.25 MG Oral Tab Take 1 tablet (0.25 mg total) by mouth nightly.    Lithium Carbonate 300 MG Oral Cap Take 1 capsule (300 mg total) by mouth at bedtime.    DULoxetine HCl 60 MG Oral Cap DR Particles Take 1 capsule (60 mg total) by mouth daily.    furosemide (LASIX) 40 MG Oral Tab Take 1 tablet (40 mg total) by mouth daily. (Patient taking differently: Take 1 tablet (40 mg total) by mouth as needed.)    fentaNYL (DURAGESIC) 100 MCG/HR Transdermal Patch 72 Hr Place 1 patch onto the skin every other day.    Cyclobenzaprine HCl (CYCLOBENZAPRINE) 10 MG Oral Tab Take 1 tablet (10 mg total) by mouth nightly as needed.   [7] No Known Allergies

## 2025-05-08 NOTE — PLAN OF CARE
Problem: Patient Centered Care  Goal: Patient preferences are identified and integrated in the patient's plan of care  Description: Interventions:- Provide timely, complete, and accurate information to patient/family- Incorporate patient and family knowledge, values, beliefs, and cultural backgrounds into the planning and delivery of care- Encourage patient/family to participate in care and decision-making at the level they choose- Honor patient and family perspectives and choices  Outcome: Progressing     Problem: PAIN - ADULT  Goal: Verbalizes/displays adequate comfort level or patient's stated pain goal  Description: INTERVENTIONS:- Encourage pt to monitor pain and request assistance- Assess pain using appropriate pain scale- Administer analgesics based on type and severity of pain and evaluate response- Implement non-pharmacological measures as appropriate and evaluate response- Consider cultural and social influences on pain and pain management- Manage/alleviate anxiety- Utilize distraction and/or relaxation techniques- Monitor for opioid side effects- Notify MD/LIP if interventions unsuccessful or patient reports new pain- Anticipate increased pain with activity and pre-medicate as appropriate  Outcome: Progressing     Problem: SAFETY ADULT - FALL  Goal: Free from fall injury  Description: INTERVENTIONS:- Assess pt frequently for physical needs- Identify cognitive and physical deficits and behaviors that affect risk of falls.- [place] fall precautions as indicated by assessment.- Educate pt/family on patient safety including physical limitations- Instruct pt to call for assistance with activity based on assessment- Modify environment to reduce risk of injury- Provide assistive devices as appropriate- Consider OT/PT consult to assist with strengthening/mobility- Encourage toileting schedule  Outcome: Progressing

## 2025-05-08 NOTE — PROGRESS NOTES
Progress Note     Izabel Martin Patient Status:  Inpatient    1970 MRN [id number]   Location [facility] 5SW/SE Attending Kenyatta Walls MD   Hosp Day # 2 PCP Anay Gould DO     Chief Complaint: patient presented with   Chief Complaint   Patient presents with    Medication Reaction       Subjective:   S: Patient denies any complaints    Review of Systems:   10 point ROS completed and was negative, except for pertinent positive and negatives stated in subjective.    Objective:   Vital signs:  Temp:  [98.4 °F (36.9 °C)-99.7 °F (37.6 °C)] 98.9 °F (37.2 °C)  Pulse:  [75-89] 78  Resp:  [18] 18  BP: (121-146)/(61-82) 121/61  SpO2:  [98 %-100 %] 98 %    Wt Readings from Last 6 Encounters:   25 147 lb (66.7 kg)   25 149 lb 12.8 oz (67.9 kg)   23 172 lb 9.6 oz (78.3 kg)   22 169 lb 4.8 oz (76.8 kg)   21 175 lb (79.4 kg)   21 173 lb (78.5 kg)         Physical Exam:    General: No acute distress. Alert ,         Respiratory: Clear to auscultation bilaterally. No wheezes. No rhonchi.  Cardiovascular: S1, S2. Regular rate and rhythm. No murmurs, rubs or gallops.   Abdomen: Soft, nontender, nondistended.  Positive bowel sounds. No rebound or guarding.  Neurologic: No focal neurological deficits.   Musculoskeletal: Moves all extremities.  Extremities: No edema.    Results:   Diagnostic Data:      Labs:    Labs Last 24 Hours:   BMP     CBC    Other     Na 146 Cl 120 BUN <5 Glu 118   Hb 12.1   PTT - Procal -   K 3.8; 3.8 CO2 20.0 Cr 0.74   WBC 12.1 >< .0  INR - CRP -   Renal Lytes Endo    Hct 38.8   Trop - D dim -   eGFR - Ca 8.9 POC Gluc  -    LFT   pBNP - Lactic -   eGFR AA - PO4 2.4 A1c -   AST - APk - Prot -  LDL -     Mg - TSH -   ALT - T иван - Alb -        COVID-19 Lab Results    COVID-19  Lab Results   Component Value Date    COVID19 Not Detected 2022    COVID19 Not Detected 2021       Pro-Calcitonin  No results for input(s): \"PCT\" in the last  168 hours.    Cardiac  No results for input(s): \"TROP\", \"PBNP\" in the last 168 hours.    Creatinine Kinase  Recent Labs   Lab 05/06/25  1150   CK 33*       Inflammatory Markers  No results for input(s): \"CRP\", \"MOI\", \"LDH\", \"DDIMER\" in the last 168 hours.    Imaging: Imaging data reviewed in Epic.    Medications: Scheduled Medications[1]    Assessment & Plan:   ASSESSMENT / PLAN:       Severe opioid withdrawal secondary to Samidorphan  History of chronic fibromyalgia and chronic pain syndrome  Psych consulted  Supportive care  Continue Klonopin  At home on large doses of opioids  Mx per psychiatry     Electrolyte imbalance  Continue electrolyte protocol    Leukocytosis  UA is negative for any infection  Chest x-ray is clear  Follow cbc in am     History of depression  Bipolar affective disorder  History of anxiety  Continue lithium  Continue Cymbalta      Quality:  DVT Prophylaxis: Lovenox   CODE status: FULL   DISPO: pending clinical improvement.   Estimated date of discharge: To be determined  Discharge is dependent on: Improved clinical status  At this point Patient is expected to be discharge to: Home versus rehab      Plan of care discussed with Patient and RN.     Coordinated care with providers and counseling re: treatment plan and workup     Kenyatta Walls MD    Supplementary Documentation:         **Certification      PHYSICIAN Certification of Need for Inpatient Hospitalization - Initial Certification    Patient will require inpatient services that will reasonably be expected to span two midnight's based on the clinical documentation in H+P.   Based on patients current state of illness, I anticipate that, after discharge, patient will require TBD.    I personally reviewed the available laboratories, imaging including operative report. I discussed/will discuss the case with patient and her nurse. I ordered laboratories studies. I adjusted medications including not applicable today. Medical decision making  high, risk is high.     >55min spent, >50% spent counseling and coordinating care in the form of educating pt/family and d/w consultants and staff. Most of the time spent discussing the above plan.                 [1]    sodium phosphate  15 mmol Intravenous Once    [START ON 5/9/2025] ARIPiprazole  5 mg Oral Daily    clonazePAM  1 mg Oral BID    lithium carbonate  300 mg Oral BID with meals    QUEtiapine  50 mg Oral Nightly    fentaNYL  1 patch Transdermal Once    enoxaparin  40 mg Subcutaneous Daily    fentaNYL  1 patch Transdermal QOD    morphINE ER  30 mg Oral TID    rOPINIRole  0.25 mg Oral Nightly

## 2025-05-08 NOTE — PLAN OF CARE
Problem: Patient Centered Care  Goal: Patient preferences are identified and integrated in the patient's plan of care  Description: Interventions:- What would you like us to know as we care for you? Take opiates daily- Provide timely, complete, and accurate information to patient/family- Incorporate patient and family knowledge, values, beliefs, and cultural backgrounds into the planning and delivery of care- Encourage patient/family to participate in care and decision-making at the level they choose- Honor patient and family perspectives and choices  Outcome: Progressing     Problem: Patient/Family Goals  Goal: Patient/Family Long Term Goal  Description: Patient's Long Term Goal: Go home Interventions:- facilitate safe care - See additional Care Plan goals for specific interventions  Outcome: Progressing  Goal: Patient/Family Short Term Goal  Description: Patient's Short Term Goal: Go home Interventions: - facilitate safe care- See additional Care Plan goals for specific interventions  Outcome: Progressing     Problem: PAIN - ADULT  Goal: Verbalizes/displays adequate comfort level or patient's stated pain goal  Description: INTERVENTIONS:- Encourage pt to monitor pain and request assistance- Assess pain using appropriate pain scale- Administer analgesics based on type and severity of pain and evaluate response- Implement non-pharmacological measures as appropriate and evaluate response- Consider cultural and social influences on pain and pain management- Manage/alleviate anxiety- Utilize distraction and/or relaxation techniques- Monitor for opioid side effects- Notify MD/LIP if interventions unsuccessful or patient reports new pain- Anticipate increased pain with activity and pre-medicate as appropriate  Outcome: Progressing     Problem: SAFETY ADULT - FALL  Goal: Free from fall injury  Description: INTERVENTIONS:- Assess pt frequently for physical needs- Identify cognitive and physical deficits and behaviors  that affect risk of falls.- [place] fall precautions as indicated by assessment.- Educate pt/family on patient safety including physical limitations- Instruct pt to call for assistance with activity based on assessment- Modify environment to reduce risk of injury- Provide assistive devices as appropriate- Consider OT/PT consult to assist with strengthening/mobility- Encourage toileting schedule  Outcome: Progressing      sensation is normal and strength is normal.

## 2025-05-08 NOTE — CONSULTS
[facility]  part of [facility]    Report of Consultation    Izabel Martin Patient Status:  Inpatient    1970 MRN [id number]   Location [facility] 5SW/SE Attending Kenyatta Walls MD   Hosp Day # 1 PCP Anay Gould DO     Date of Admission:  2025  Date of Consult:  2025   Reason for Consultation:   Patient presented with increased confusion, restlessness, agitation, Kenyatta Otoole MD requested psychiatric consult for evaluation and advice.    Consult Duration     The patient seen for initial psychiatric consult evaluation.   Record reviewed, communication with attending, communication with RN and patient seen face to face evaluation.    History of Present Illness:   Patient is a 54 year old   female with history of bipolar disorder, chronic pain syndrome, fibromyalgia and history of MAYUR who presented to the hospital with intractable nausea and found with severe withdrawal symptom and confusion.  Patient admitted to the medical floor and psych consult requested for evaluation and advise.    Per chart review, the patient started on new medication by her psychiatry and the patient has been on fentanyl patch 100 mcg every third day and oxycodone 10 mg 3 to 4 tablets daily.  Patient has been demonstrating alternation in her mood and receive sample of Lybalvi (combination of olanzapine plus samidorphan).  The patient in the ER found with GFR of 53, magnesium 2.1 and WBC is 20.4.    I received multiple call through the day from the nurse stating that patient wanted to go AMA and the patient is very restless and irritable.    Visiting the patient today to see her in the room laying down in her bed and  at bedside.  The patient will be restless, noticeably friendly but confused and disoriented.  Patient have to hold for thought to remember certain name of medication or information with noticeable impairment in her memory today.    The patient  today reporting that she has bipolar disorder and recently her mood has been up-and-down.  Patient reported that she saw her psychiatrist recently and the suggestion to start Lybalvi.  Patient reported that taking 1 dosage of this medication cause increased restlessness, increased anxiety, palpitation, nausea and abdominal cramping.     at bedside reporting that he noticed withdrawal symptom similar to the one she used to have when she ran out of her narcotic medication.   started to investigate the medicine and learned that samidorphan with opiate will cause withdrawal.    The patient today demonstrating confusion, disorientation and did not demonstrate ability to fully understand her condition especially with being restless and frequent desire to leave AMA.  After we communicate and we agree on treatment the patient stand up preparing herself to be discharged thinking I am allowing her to go home?     reporting that this is not her usual self and he understand the treatment plan and agreeable on the process.    Patient denying any suicidal ideation.  Patient has been demonstrating some response to internal stimuli with intense distractibility, racing thoughts, irritability and frequent restlessness.    The patient has been demonstrating delirium episode with alternation in mood and cognition with episodes of  increased confusion, restlessness, agitation and response to internal stimuli.       Past Psychiatric/Medication History:  1. Prior diagnoses: Bipolar disorder  2. Past psychiatric inpatient: Unknown  3. Past outpatient history: Nurse petitioner  4. Past suicide history: Denied any  5. Medication history: Lithium, allopurinol, duloxetine, Klonopin    Social History:   Patient is .  Lives with     Family History:  No psychiatric family history  Medical History:   Past Medical History  Past Medical History[1]    Past Surgical History  Past Surgical History[2]    Family  History  Family History[3]    Social History  Short Social Hx on File[4]        Current Medications:  Current Hospital Medications[5]  Prescriptions Prior to Admission[6]    Allergies  Allergies[7]    Review of Systems:   As by Admitting/Attending    Results:   Laboratory Data:  Lab Results   Component Value Date    WBC 16.3 (H) 05/07/2025    HGB 13.7 05/07/2025    HCT 41.0 05/07/2025    .0 05/07/2025    CREATSERUM 0.83 05/07/2025    BUN 7 (L) 05/07/2025     05/07/2025    K 2.4 (LL) 05/07/2025     05/07/2025    CO2 25.0 05/07/2025     (H) 05/07/2025    CA 9.8 05/07/2025    ALB 4.7 05/06/2025    ALKPHO 134 (H) 05/06/2025    TP 7.7 05/06/2025    AST 27 05/06/2025    ALT 33 05/06/2025    TSH 3.18 01/17/2019    LIP 52 (L) 09/18/2022    MG 2.1 05/06/2025    PHOS 0.5 (LL) 05/06/2025    CK 33 (L) 05/06/2025    ETOH <3 05/06/2025         Imaging:  No results found.    Vital Signs:   Blood pressure 132/71, pulse 75, temperature 99.7 °F (37.6 °C), temperature source Oral, resp. rate 18, weight 66.7 kg (147 lb), SpO2 98%.    Mental Status Exam:   Appearance: Stated age female, obese who dressed casually with somewhat disheveled hair sitting in her bed.  Psychomotor: Patient has been demonstrating restlessness and agitation.  Orientation: Alert and oriented to person, place as hospital but confused to date and condition  Gait: Intact  Attitude/Coorperation: Patient attempted to cooperate otherwise restless with intense distractibility.  Behavior: episodes of restlessness and confusion.  Speech: Talkative with a scattered thought process.  Mood: anxious and restless  Affect: Labile affect  Thought process: Confused, disorganized  Thought content: No reports of  suicidal or homicidal ideation.  Perceptions: Patient has been demonstrating response to internal stimuli.  Concentration: Grossly impaired  Memory: Grossly impaired  Intellect: Average.  Judgment and Insight: Questionable.     Impression:      Bipolar mixed severe with psychotic feature.  Opiate withdrawal.  Opiate dependence, continuous.  Intractable nausea  Tremor  Hypokalemia  Hypophosphatemia    The patient is a 54-year-old   female with history of bipolar disorder who has been demonstrating recent episode of alternation in her mood and increased as anxiety, restlessness and difficulty sleeping.  The patient started by her APN on Lybalvi which is combination of olanzapine and samidorphan.  Search addition to her narcotic medication when the patient on fentanyl patch and oxycodone has caused immediate withdrawal symptom.    The patient currently has been demonstrating mixed component of opiate withdrawal on superimposed on mixed bipolar episode, chronic pain and severe anxiety.  The patient currently has been demonstrating delirium episode with alternation in mood and cognition with episodes of  increased confusion, restlessness, agitation and response to internal stimuli.     Discussed risk and benefit, acknowledging the current symptom and severity.  At this point, I would recommend the following approach:     Focus on safety.  Establish safety sitter with no certificate or petition patient is delirious and want to go home and she is not stable.  Focus on education and support.  Focus on insight orientation helping the patient understand diagnosis and treatment plan.  Change duloxetine to 30 mg daily.  Change lithium to 300 mg twice daily with meal.  Creatinine level is normal.  Start Abilify 2 mg daily.  Start Seroquel 50 mg nightly.  Lower Klonopin to 1 mg twice daily.  Consider antiseizure medication.  Processed with patient/ at length, the initiation of the above psychotropic medications I advised the patient of the risks, benefits, alternatives and potential side effects. The patient consents to administration of the medications and understands the right to refuse medications at any time. The patient verbalized  understanding.   Coordinate plan with team    Orders This Visit:  Orders Placed This Encounter   Procedures    CBC With Differential With Platelet    Comp Metabolic Panel (14)    Scan slide    MD BLOOD SMEAR CONSULT    CK (Creatine Kinase) (Not Creatinine)    Urinalysis with Culture Reflex    Drug Abuse Panel 11 screen    Ethyl Alcohol    Magnesium    Phosphorus    Basic Metabolic Panel (8)    CBC With Differential With Platelet    Phosphorus    Potassium    CBC With Differential With Platelet    Basic Metabolic Panel (8)    Clostridium difficile(toxigenic)PCR       Meds This Visit:  Requested Prescriptions      No prescriptions requested or ordered in this encounter       Genaro Patel MD  5/7/2025    Note to Patient: The 21st Century Cures Act makes medical notes like these available to patients in the interest of transparency. However, be advised this is a medical document. It is intended as peer to peer communication. It is written in medical language and may contain abbreviations or verbiage that are unfamiliar. It may appear blunt or direct. Medical documents are intended to carry relevant information, facts as evident, and the clinical opinion of the practitioner. This note may have been transcribed using a voice dictation system. Voice recognition errors may occur. This should not be taken to alter the content or meaning of this note.          [1]   Past Medical History:   Anxiety    Bipolar 1 disorder (HCC)    Birth control    \"birth control tabs\"    Brain aneurysm (HCC)    Depression    Fibromyalgia    chronic pain    Hip fracture (HCC)    femur pinning, pelvis    Hyperlipidemia    Manic behavior (HCC)    Multiple falls    Wound dehiscence    R knee, Debridement R knee wound, flap closure   [2]   Past Surgical History:  Procedure Laterality Date    Brain surgery  2008    aneurysm    Foot surgery      Fracture surgery      Knee surgery  4/22/2014    Debridement R knee wound, flap closure    Pelvis/hip  joint surgery unlisted      femur pinning, pelvis   [3]   Family History  Problem Relation Age of Onset    Hypertension Mother     Uterine Cancer Mother     Prostate Cancer Father     Dementia Father    [4]   Social History  Socioeconomic History    Marital status:    Tobacco Use    Smoking status: Every Day     Current packs/day: 0.50     Average packs/day: 0.5 packs/day for 28.0 years (14.0 ttl pk-yrs)     Types: Cigarettes    Smokeless tobacco: Never   Vaping Use    Vaping status: Never Used   Substance and Sexual Activity    Alcohol use: No     Alcohol/week: 0.0 standard drinks of alcohol    Drug use: Yes     Types: Cocaine    Sexual activity: Yes   Other Topics Concern    Caffeine Concern No     Social Drivers of Health     Food Insecurity: No Food Insecurity (5/6/2025)    NCSS - Food Insecurity     Worried About Running Out of Food in the Last Year: No     Ran Out of Food in the Last Year: No   Transportation Needs: No Transportation Needs (5/6/2025)    NCSS - Transportation     Lack of Transportation: No   Housing Stability: Not At Risk (5/6/2025)    NCSS - Housing/Utilities     Has Housing: Yes     Worried About Losing Housing: No     Unable to Get Utilities: No   [5]   Current Facility-Administered Medications   Medication Dose Route Frequency    haloperidol lactate (Haldol) 5 MG/ML injection 2 mg  2 mg Intravenous Q6H PRN    clonazePAM (KlonoPIN) tab 1 mg  1 mg Oral BID    ARIPiprazole (Abilify) tab 2 mg  2 mg Oral Daily    fentaNYL (Duragesic) 100 MCG/HR patch 1 patch  1 patch Transdermal Once    sodium chloride 0.9% infusion   Intravenous Continuous    enoxaparin (Lovenox) 40 MG/0.4ML SUBQ injection 40 mg  40 mg Subcutaneous Daily    acetaminophen (Tylenol Extra Strength) tab 500 mg  500 mg Oral Q4H PRN    ondansetron (Zofran) 4 MG/2ML injection 4 mg  4 mg Intravenous Q6H PRN    prochlorperazine (Compazine) 10 MG/2ML injection 5 mg  5 mg Intravenous Q8H PRN    LORazepam (Ativan) 2 mg/mL injection  2 mg  2 mg Intravenous Q4H PRN    DULoxetine (Cymbalta) DR cap 60 mg  60 mg Oral Daily    [START ON 5/8/2025] fentaNYL (Duragesic) 100 MCG/HR patch 1 patch  1 patch Transdermal QOD    lithium carbonate cap 300 mg  300 mg Oral Nightly    morphINE ER (MS Contin) tab 30 mg  30 mg Oral TID    rOPINIRole (Requip) tab 0.25 mg  0.25 mg Oral Nightly    zolpidem (Ambien) tab 10 mg  10 mg Oral Nightly    cyclobenzaprine (Flexeril) tab 10 mg  10 mg Oral TID PRN    oxyCODONE immediate release tab 10 mg  10 mg Oral QID PRN    SUMAtriptan (Imitrex) tab 100 mg  100 mg Oral Daily PRN   [6]   Medications Prior to Admission   Medication Sig    clonazePAM 1 MG Oral Tab Take 2 tablets (2 mg total) by mouth every morning.    clonazePAM 1 MG Oral Tab Take 1 tablet (1 mg total) by mouth every evening.    morphINE ER 30 MG Oral Tab CR Take 1 tablet (30 mg total) by mouth 3 (three) times daily.    oxyCODONE 10 MG Oral Tab Take 1 tablet (10 mg total) by mouth 4 (four) times daily as needed for Pain.    pravastatin 40 MG Oral Tab Take 1 tablet (40 mg total) by mouth nightly.    zolpidem 10 MG Oral Tab Take 1 tablet (10 mg total) by mouth nightly.    SUMAtriptan Succinate 100 MG Oral Tab Take 1 tablet (100 mg total) by mouth daily as needed.    rOPINIRole HCl 0.25 MG Oral Tab Take 1 tablet (0.25 mg total) by mouth nightly.    Lithium Carbonate 300 MG Oral Cap Take 1 capsule (300 mg total) by mouth at bedtime.    DULoxetine HCl 60 MG Oral Cap DR Particles Take 1 capsule (60 mg total) by mouth daily.    furosemide (LASIX) 40 MG Oral Tab Take 1 tablet (40 mg total) by mouth daily. (Patient taking differently: Take 1 tablet (40 mg total) by mouth as needed.)    fentaNYL (DURAGESIC) 100 MCG/HR Transdermal Patch 72 Hr Place 1 patch onto the skin every other day.    Cyclobenzaprine HCl (CYCLOBENZAPRINE) 10 MG Oral Tab Take 1 tablet (10 mg total) by mouth nightly as needed.   [7] No Known Allergies

## 2025-05-08 NOTE — PLAN OF CARE
Problem: Patient Centered Care  Goal: Patient preferences are identified and integrated in the patient's plan of care  Description: Interventions:- What would you like us to know as we care for you? Home with - Provide timely, complete, and accurate information to patient/family- Incorporate patient and family knowledge, values, beliefs, and cultural backgrounds into the planning and delivery of care- Encourage patient/family to participate in care and decision-making at the level they choose- Honor patient and family perspectives and choices  Outcome: Progressing     Problem: PAIN - ADULT  Goal: Verbalizes/displays adequate comfort level or patient's stated pain goal  Description: INTERVENTIONS:- Encourage pt to monitor pain and request assistance- Assess pain using appropriate pain scale- Administer analgesics based on type and severity of pain and evaluate response- Implement non-pharmacological measures as appropriate and evaluate response- Consider cultural and social influences on pain and pain management- Manage/alleviate anxiety- Utilize distraction and/or relaxation techniques- Monitor for opioid side effects- Notify MD/LIP if interventions unsuccessful or patient reports new pain- Anticipate increased pain with activity and pre-medicate as appropriate  Outcome: Progressing     Problem: SAFETY ADULT - FALL  Goal: Free from fall injury  Description: INTERVENTIONS:- Assess pt frequently for physical needs- Identify cognitive and physical deficits and behaviors that affect risk of falls.- [place] fall precautions as indicated by assessment.- Educate pt/family on patient safety including physical limitations- Instruct pt to call for assistance with activity based on assessment- Modify environment to reduce risk of injury- Provide assistive devices as appropriate- Consider OT/PT consult to assist with strengthening/mobility- Encourage toileting schedule  Outcome: Progressing   Patient A/O x4, vitals  signs stable, good appetite, IVF stopped. Per  24 hrs sitter discontinued. Plan for discharge home tomorrow.

## 2025-05-09 VITALS
DIASTOLIC BLOOD PRESSURE: 49 MMHG | OXYGEN SATURATION: 97 % | HEART RATE: 65 BPM | BODY MASS INDEX: 29 KG/M2 | RESPIRATION RATE: 16 BRPM | SYSTOLIC BLOOD PRESSURE: 114 MMHG | TEMPERATURE: 98 F | WEIGHT: 147 LBS

## 2025-05-09 LAB
ANION GAP SERPL CALC-SCNC: 3 MMOL/L (ref 0–18)
BUN BLD-MCNC: 7 MG/DL (ref 9–23)
BUN/CREAT SERPL: 9 (ref 10–20)
CALCIUM BLD-MCNC: 9.5 MG/DL (ref 8.7–10.4)
CHLORIDE SERPL-SCNC: 117 MMOL/L (ref 98–112)
CO2 SERPL-SCNC: 25 MMOL/L (ref 21–32)
CREAT BLD-MCNC: 0.78 MG/DL (ref 0.55–1.02)
EGFRCR SERPLBLD CKD-EPI 2021: 90 ML/MIN/1.73M2 (ref 60–?)
GLUCOSE BLD-MCNC: 97 MG/DL (ref 70–99)
OSMOLALITY SERPL CALC.SUM OF ELEC: 298 MOSM/KG (ref 275–295)
PHOSPHATE SERPL-MCNC: 2.8 MG/DL (ref 2.4–5.1)
POTASSIUM SERPL-SCNC: 4.6 MMOL/L (ref 3.5–5.1)
SODIUM SERPL-SCNC: 145 MMOL/L (ref 136–145)

## 2025-05-09 PROCEDURE — 99233 SBSQ HOSP IP/OBS HIGH 50: CPT | Performed by: OTHER

## 2025-05-09 PROCEDURE — 99239 HOSP IP/OBS DSCHRG MGMT >30: CPT | Performed by: STUDENT IN AN ORGANIZED HEALTH CARE EDUCATION/TRAINING PROGRAM

## 2025-05-09 RX ORDER — LURASIDONE HYDROCHLORIDE 20 MG/1
20 TABLET, FILM COATED ORAL
Qty: 30 TABLET | Refills: 0 | Status: CANCELLED | OUTPATIENT
Start: 2025-05-09

## 2025-05-09 RX ORDER — FUROSEMIDE 40 MG/1
40 TABLET ORAL AS NEEDED
Status: SHIPPED | COMMUNITY
Start: 2025-05-09

## 2025-05-09 RX ORDER — LITHIUM CARBONATE 300 MG/1
300 CAPSULE ORAL 2 TIMES DAILY WITH MEALS
Qty: 60 CAPSULE | Refills: 0 | Status: SHIPPED | OUTPATIENT
Start: 2025-05-09

## 2025-05-09 RX ORDER — QUETIAPINE FUMARATE 50 MG/1
50 TABLET, FILM COATED ORAL NIGHTLY
Qty: 30 TABLET | Refills: 0 | Status: SHIPPED | OUTPATIENT
Start: 2025-05-09

## 2025-05-09 RX ORDER — CLONAZEPAM 1 MG/1
1 TABLET ORAL 2 TIMES DAILY
Status: SHIPPED | COMMUNITY
Start: 2025-05-09

## 2025-05-09 RX ORDER — LURASIDONE HYDROCHLORIDE 20 MG/1
20 TABLET, FILM COATED ORAL
Status: DISCONTINUED | OUTPATIENT
Start: 2025-05-09 | End: 2025-05-09

## 2025-05-09 RX ORDER — LURASIDONE HYDROCHLORIDE 20 MG/1
20 TABLET, FILM COATED ORAL
Qty: 30 TABLET | Refills: 0 | Status: SHIPPED | OUTPATIENT
Start: 2025-05-09 | End: 2025-06-08

## 2025-05-09 NOTE — PLAN OF CARE
Problem: Patient Centered Care  Goal: Patient preferences are identified and integrated in the patient's plan of care  Description: Interventions:- What would you like us to know as we care for you? - Provide timely, complete, and accurate information to patient/family- Incorporate patient and family knowledge, values, beliefs, and cultural backgrounds into the planning and delivery of care- Encourage patient/family to participate in care and decision-making at the level they choose- Honor patient and family perspectives and choices  Outcome: Progressing     Problem: Patient/Family Goals  Goal: Patient/Family Long Term Goal  Description: Patient's Long Term Goal: Interventions:- - See additional Care Plan goals for specific interventions  Outcome: Progressing  Goal: Patient/Family Short Term Goal  Description: Patient's Short Term Goal: Interventions: - - See additional Care Plan goals for specific interventions  Outcome: Progressin     Problem: PAIN - ADULT  Goal: Verbalizes/displays adequate comfort level or patient's stated pain goal  Description: INTERVENTIONS:- Encourage pt to monitor pain and request assistance- Assess pain using appropriate pain scale- Administer analgesics based on type and severity of pain and evaluate response- Implement non-pharmacological measures as appropriate and evaluate response- Consider cultural and social influences on pain and pain management- Manage/alleviate anxiety- Utilize distraction and/or relaxation techniques- Monitor for opioid side effects- Notify MD/LIP if interventions unsuccessful or patient reports new pain- Anticipate increased pain with activity and pre-medicate as appropriate  Outcome: Progressing     Problem: SAFETY ADULT - FALL  Goal: Free from fall injury  Description: INTERVENTIONS:- Assess pt frequently for physical needs- Identify cognitive and physical deficits and behaviors that affect risk of falls.- [place] fall precautions as indicated by  assessment.- Educate pt/family on patient safety including physical limitations- Instruct pt to call for assistance with activity based on assessment- Modify environment to reduce risk of injury- Provide assistive devices as appropriate- Consider OT/PT consult to assist with strengthening/mobility- Encourage toileting schedule  Outcome: Progressing

## 2025-05-09 NOTE — PLAN OF CARE
Problem: Patient Centered Care  Goal: Patient preferences are identified and integrated in the patient's plan of care  Description: Interventions:- What would you like us to know as we care for you? - Provide timely, complete, and accurate information to patient/family- Incorporate patient and family knowledge, values, beliefs, and cultural backgrounds into the planning and delivery of care- Encourage patient/family to participate in care and decision-making at the level they choose- Honor patient and family perspectives and choices  Outcome: Completed     Problem: Patient/Family Goals  Goal: Patient/Family Long Term Goal  Description: Patient's Long Term Goal: Interventions:- - See additional Care Plan goals for specific interventions  Outcome: Completed  Goal: Patient/Family Short Term Goal  Description: Patient's Short Term Goal: Interventions: - - See additional Care Plan goals for specific interventions  Outcome: Completed     Problem: PAIN - ADULT  Goal: Verbalizes/displays adequate comfort level or patient's stated pain goal  Description: INTERVENTIONS:- Encourage pt to monitor pain and request assistance- Assess pain using appropriate pain scale- Administer analgesics based on type and severity of pain and evaluate response- Implement non-pharmacological measures as appropriate and evaluate response- Consider cultural and social influences on pain and pain management- Manage/alleviate anxiety- Utilize distraction and/or relaxation techniques- Monitor for opioid side effects- Notify MD/LIP if interventions unsuccessful or patient reports new pain- Anticipate increased pain with activity and pre-medicate as appropriate  Outcome: Completed     Problem: SAFETY ADULT - FALL  Goal: Free from fall injury  Description: INTERVENTIONS:- Assess pt frequently for physical needs- Identify cognitive and physical deficits and behaviors that affect risk of falls.- [place] fall precautions as indicated by assessment.-  Educate pt/family on patient safety including physical limitations- Instruct pt to call for assistance with activity based on assessment- Modify environment to reduce risk of injury- Provide assistive devices as appropriate- Consider OT/PT consult to assist with strengthening/mobility- Encourage toileting schedule  Outcome: Completed

## 2025-05-09 NOTE — PROGRESS NOTES
Patient is a 54 year old ,  female with history of bipolar disorder, chronic pain syndrome, fibromyalgia and history of MAYUR who presented to the hospital with intractable nausea and found with severe withdrawal symptom and confusion.    Consult Duration     The patient seen for over 50-minute, follow-up evaluation, over 50% counseling and coordinating care addressing severe opiate withdrawal after starting Lybalvi.  Record reviewed, communication with attending, communication with RN and patient seen face to face evaluation.     History of Present Illness:   Communicating with the team overnight, patient was calm except for refusing her scheduled dose of Abilify.    Psychotropic medications given: Klonopin 1 mg, Seroquel 50 mg, Requip 0.25 mg, Lithium 300 mg    Labs and imaging reviewed: none in past 24 hours    I visited the patient this morning. The patient is standing by her bed in a gown with street clothes underneath, visibly ready to leave. She is very eager to go home and has even written so on the patient assignment board. I asked her to sit down on her bed.    The patient says that she is feeling much better, and is much more alert and conversational.   The patient admitted that she has been able to sleep better last night and she is more relaxed.  Patient was able to demonstrate normal smile able to interact appropriately with no major disturbance has been observed.    She reports that her reason for refusing the Abilify was do to an adverse side effect of weight gain a few years ago, where she reportedly gained 50 pounds in less than six months. I supported her decision to stop the Abilify and plan on prescribing Latuda instead.    I went over her medications again, and she was agreeable with the plan. I once again re-iterated that she is to avoid antidepressants. Given the withdrawal precipitated by Lybalvi, she no longer trusts that provider and is planning to find a new psychiatrist. If  her insurance is in my network, she plans on following up with me.     The patient is cleared from a psychiatric standpoint, and pending clearance from the rest of the team, will be discharged home today.     The patient denying any suicidal or homicidal ideation.  Patient denied any auditory or visual hallucination.  Patient demonstrating appropriate insight and connected with the treatment plan and feels much better.    The patient is demonstrating continued improvement after presenting with a delirium episode with alternation in mood and cognition with episodes of  increased confusion, restlessness, agitation and response to internal stimuli in light of opiate withdrawal secondary to starting Lybalvi. Patient to be discharged today.     Past Psychiatric/Medication History:  1. Prior diagnoses: Bipolar disorder  2. Past psychiatric inpatient: Unknown  3. Past outpatient history: Nurse petitioner  4. Past suicide history: Denied any  5. Medication history: Lithium, allopurinol, duloxetine, Klonopin    Social History:   Patient is .  Lives with     Family History:  No psychiatric family history  Medical History:   Past Medical History  Past Medical History[1]    Past Surgical History  Past Surgical History[2]    Family History  Family History[3]    Social History  Short Social Hx on File[4]        Current Medications:  Current Hospital Medications[5]  Prescriptions Prior to Admission[6]    Allergies  Allergies[7]    Review of Systems:   As by Admitting/Attending    Results:   Laboratory Data:  Lab Results   Component Value Date    WBC 12.1 (H) 05/08/2025    HGB 12.1 05/08/2025    HCT 38.8 05/08/2025    .0 05/08/2025    CREATSERUM 0.78 05/09/2025    BUN 7 (L) 05/09/2025     05/09/2025    K 4.6 05/09/2025     (H) 05/09/2025    CO2 25.0 05/09/2025    GLU 97 05/09/2025    CA 9.5 05/09/2025    ALB 4.7 05/06/2025    ALKPHO 134 (H) 05/06/2025    TP 7.7 05/06/2025    AST 27 05/06/2025    ALT  33 05/06/2025    TSH 3.18 01/17/2019    LIP 52 (L) 09/18/2022    MG 2.1 05/06/2025    PHOS 2.8 05/09/2025    CK 33 (L) 05/06/2025    ETOH <3 05/06/2025         Imaging:  No results found.    Vital Signs:   Blood pressure 114/49, pulse 65, temperature 98.1 °F (36.7 °C), temperature source Oral, resp. rate 16, weight 66.7 kg (147 lb), SpO2 97%.    Mental Status Exam:   Appearance: Stated age female, obese, seated in bed.  Psychomotor: Patient today not demontrating agitation or retardation.  Orientation: Alert and oriented to person, place, time, and condition.  Gait: Not evaluated  Attitude/Coorperation: Cooperative and attentive.   Behavior: Appropriate  Speech: Regular rate and rhythm of speech  Mood: \"feel better\"  Affect: Appropriate affect congruent with mood  Thought process: Linear and appropriate  Thought content: No reports of  suicidal or homicidal ideation.  Perceptions: Denies any auditory or visual hallucinations  Concentration: Grossly intact  Memory: Grossly intact.  Intellect: Average.  Judgment and Insight: Intact.  Patient demonstrated ability to understand her medical condition interact with the treatment plan.    Impression:     Bipolar mixed severe with psychotic feature.  Opiate withdrawal.  Opiate dependence, continuous.  Intractable nausea  Tremor  Hypokalemia  Hypophosphatemia    The patient is a 54-year-old   female with history of bipolar disorder who has been demonstrating recent episode of alternation in her mood and increased as anxiety, restlessness and difficulty sleeping.  The patient started by her APN on Lybalvi which is combination of olanzapine and samidorphan.  Search addition to her narcotic medication when the patient on fentanyl patch and oxycodone has caused immediate withdrawal symptom.    The patient currently has been demonstrating mixed component of opiate withdrawal on superimposed on mixed bipolar episode, chronic pain and severe anxiety.  The patient  currently has been demonstrating delirium episode with alternation in mood and cognition with episodes of  increased confusion, restlessness, agitation and response to internal stimuli.     5/8/2025: Patient has demonstrated significant improvement since yesterday. Will discontinue duloxetine. Otherwise medication regimen is unchanged. Plan for discharge home tomorrow.    5/9/2025: The patient continues to improve. I will discontinue her Abilify and prescribe Latuda 20 mg. Patient to be discharged today and will follow-up outpatient.     Discussed risk and benefit, acknowledging the current symptom and severity.  At this point, I would recommend the following approach:     Focus on safety.    Focus on education and support.  Focus on insight orientation helping the patient understand diagnosis and treatment plan.  Discontinue Abilify 5 mg.  Start Latuda 20 mg with dinner.  Continue lithium 300 mg twice daily with meal.    Continue Seroquel 50 mg nightly.  Continue Klonopin 1 mg twice daily.  Consider antiseizure medication.  Processed with patient at length, the initiation of the above psychotropic medications I advised the patient of the risks, benefits, alternatives and potential side effects. The patient consents to administration of the medications and understands the right to refuse medications at any time. The patient verbalized understanding.   Coordinate plan with team    Orders This Visit:  Orders Placed This Encounter   Procedures    CBC With Differential With Platelet    Comp Metabolic Panel (14)    Scan slide    MD BLOOD SMEAR CONSULT    CK (Creatine Kinase) (Not Creatinine)    Urinalysis with Culture Reflex    Drug Abuse Panel 11 screen    Ethyl Alcohol    Magnesium    Phosphorus    Basic Metabolic Panel (8)    CBC With Differential With Platelet    Phosphorus    Potassium    CBC With Differential With Platelet    Basic Metabolic Panel (8)    Potassium    Potassium    Basic Metabolic Panel (8)     Phosphorus       Meds This Visit:  Requested Prescriptions     Pending Prescriptions Disp Refills    lithium carbonate 300 MG Oral Cap 60 capsule 0     Sig: Take 1 capsule (300 mg total) by mouth 2 (two) times daily with meals.    lurasidone 20 MG Oral Tab 30 tablet 0     Sig: Take 1 tablet (20 mg total) by mouth daily with dinner.    QUEtiapine 50 MG Oral Tab 30 tablet 0     Sig: Take 1 tablet (50 mg total) by mouth nightly.       Genaro Patel MD  5/9/2025    Note to Patient: The 21st Century Cures Act makes medical notes like these available to patients in the interest of transparency. However, be advised this is a medical document. It is intended as peer to peer communication. It is written in medical language and may contain abbreviations or verbiage that are unfamiliar. It may appear blunt or direct. Medical documents are intended to carry relevant information, facts as evident, and the clinical opinion of the practitioner. This note may have been transcribed using a voice dictation system. Voice recognition errors may occur. This should not be taken to alter the content or meaning of this note.            [1]   Past Medical History:   Anxiety    Bipolar 1 disorder (HCC)    Birth control    \"birth control tabs\"    Brain aneurysm (HCC)    Depression    Fibromyalgia    chronic pain    Hip fracture (HCC)    femur pinning, pelvis    Hyperlipidemia    Manic behavior (HCC)    Multiple falls    Wound dehiscence    R knee, Debridement R knee wound, flap closure   [2]   Past Surgical History:  Procedure Laterality Date    Brain surgery  2008    aneurysm    Foot surgery      Fracture surgery      Knee surgery  4/22/2014    Debridement R knee wound, flap closure    Pelvis/hip joint surgery unlisted      femur pinning, pelvis   [3]   Family History  Problem Relation Age of Onset    Hypertension Mother     Uterine Cancer Mother     Prostate Cancer Father     Dementia Father    [4]   Social History  Socioeconomic History     Marital status:    Tobacco Use    Smoking status: Every Day     Current packs/day: 0.50     Average packs/day: 0.5 packs/day for 28.0 years (14.0 ttl pk-yrs)     Types: Cigarettes    Smokeless tobacco: Never   Vaping Use    Vaping status: Never Used   Substance and Sexual Activity    Alcohol use: No     Alcohol/week: 0.0 standard drinks of alcohol    Drug use: Yes     Types: Cocaine    Sexual activity: Yes   Other Topics Concern    Caffeine Concern No     Social Drivers of Health     Food Insecurity: No Food Insecurity (5/6/2025)    NCSS - Food Insecurity     Worried About Running Out of Food in the Last Year: No     Ran Out of Food in the Last Year: No   Transportation Needs: No Transportation Needs (5/6/2025)    NCSS - Transportation     Lack of Transportation: No   Housing Stability: Not At Risk (5/6/2025)    NCSS - Housing/Utilities     Has Housing: Yes     Worried About Losing Housing: No     Unable to Get Utilities: No   [5]   Current Facility-Administered Medications   Medication Dose Route Frequency    lurasidone (Latuda) tab 20 mg  20 mg Oral Daily with dinner    haloperidol lactate (Haldol) 5 MG/ML injection 2 mg  2 mg Intravenous Q6H PRN    clonazePAM (KlonoPIN) tab 1 mg  1 mg Oral BID    lithium carbonate cap 300 mg  300 mg Oral BID with meals    zolpidem (Ambien) tab 5 mg  5 mg Oral Nightly PRN    QUEtiapine (SEROquel) tab 50 mg  50 mg Oral Nightly    fentaNYL (Duragesic) 100 MCG/HR patch 1 patch  1 patch Transdermal Once    enoxaparin (Lovenox) 40 MG/0.4ML SUBQ injection 40 mg  40 mg Subcutaneous Daily    acetaminophen (Tylenol Extra Strength) tab 500 mg  500 mg Oral Q4H PRN    ondansetron (Zofran) 4 MG/2ML injection 4 mg  4 mg Intravenous Q6H PRN    prochlorperazine (Compazine) 10 MG/2ML injection 5 mg  5 mg Intravenous Q8H PRN    LORazepam (Ativan) 2 mg/mL injection 2 mg  2 mg Intravenous Q4H PRN    fentaNYL (Duragesic) 100 MCG/HR patch 1 patch  1 patch Transdermal QOD    morphINE ER (MS  Contin) tab 30 mg  30 mg Oral TID    rOPINIRole (Requip) tab 0.25 mg  0.25 mg Oral Nightly    cyclobenzaprine (Flexeril) tab 10 mg  10 mg Oral TID PRN    oxyCODONE immediate release tab 10 mg  10 mg Oral QID PRN    SUMAtriptan (Imitrex) tab 100 mg  100 mg Oral Daily PRN   [6]   Medications Prior to Admission   Medication Sig    furosemide 40 MG Oral Tab Take 1 tablet (40 mg total) by mouth as needed.    clonazePAM 1 MG Oral Tab Take 2 tablets (2 mg total) by mouth every morning.    clonazePAM 1 MG Oral Tab Take 1 tablet (1 mg total) by mouth every evening.    morphINE ER 30 MG Oral Tab CR Take 1 tablet (30 mg total) by mouth 3 (three) times daily.    oxyCODONE 10 MG Oral Tab Take 1 tablet (10 mg total) by mouth 4 (four) times daily as needed for Pain.    pravastatin 40 MG Oral Tab Take 1 tablet (40 mg total) by mouth nightly.    zolpidem 10 MG Oral Tab Take 1 tablet (10 mg total) by mouth nightly.    SUMAtriptan Succinate 100 MG Oral Tab Take 1 tablet (100 mg total) by mouth daily as needed.    rOPINIRole HCl 0.25 MG Oral Tab Take 1 tablet (0.25 mg total) by mouth nightly.    Lithium Carbonate 300 MG Oral Cap Take 1 capsule (300 mg total) by mouth at bedtime.    DULoxetine HCl 60 MG Oral Cap DR Particles Take 1 capsule (60 mg total) by mouth daily.    fentaNYL (DURAGESIC) 100 MCG/HR Transdermal Patch 72 Hr Place 1 patch onto the skin every other day.    Cyclobenzaprine HCl (CYCLOBENZAPRINE) 10 MG Oral Tab Take 1 tablet (10 mg total) by mouth nightly as needed.    [DISCONTINUED] furosemide (LASIX) 40 MG Oral Tab Take 1 tablet (40 mg total) by mouth daily. (Patient taking differently: Take 1 tablet (40 mg total) by mouth as needed.)   [7] No Known Allergies

## 2025-05-09 NOTE — PROGRESS NOTES
This RN received discharge order from MD's. Updated patient that med rec needed to be completed prior to providing discharge education and paperwork. PCT called this RN to ask if patient was discharged because PCT could not find patient in the room. This RN attempted to call patient and patient's  with no answer. Left VM to patient's mobile phone to return if patient would like discharge education and paperwork. Meds sent to pharmacy by MD. Charge RN aware.

## 2025-05-10 NOTE — DISCHARGE SUMMARY
[facility]  part of [facility]    Discharge Summary    Izabel Martin Patient Status:  Inpatient    1970 MRN [id number]   Location [facility] 5SW/SE Attending No att. providers found   Hosp Day # 3 PCP Anay Gould DO     Date of Admission: 2025   Date of Discharge: 2025 12:30 PM    Admitting Diagnosis: Hypokalemia [E87.6]  Tremor [R25.1]  Hypophosphatemia [E83.39]  Chronic, continuous use of opioids [F11.90]  Adverse effect of drug, initial encounter [T50.905A]  Intractable nausea [R11.0]    Disposition: Home    Discharge Diagnosis: .Principal Problem:    Intractable nausea  Active Problems:    Adverse effect of drug, initial encounter    Chronic, continuous use of opioids    Tremor    Hypokalemia    Hypophosphatemia    Opiate withdrawal (HCC)    Bipolar affective disorder, mixed, severe, with psychotic behavior (HCC)      Hospital Course:   Reason for Admission: Delirium episode with alternation in mood and cognition with episodes of  increased confusion, restlessness, agitation and response to internal stimuli in light of opiate withdrawal secondary to starting Lybalvi      Discharge Physical Exam:   General: No acute distress. Alert ,         Respiratory: Clear to auscultation bilaterally. No wheezes. No rhonchi.  Cardiovascular: S1, S2. Regular rate and rhythm. No murmurs, rubs or gallops.   Abdomen: Soft, nontender, nondistended.  Positive bowel sounds. No rebound or guarding.  Neurologic: No focal neurological deficits.   Musculoskeletal: Moves all extremities.  Extremities: No edema.      Hospital Course:   The patient is a 54-year-old  female with chronic pain syndrome and fibromyalgia, usually on fentanyl patch 100 mcg every third day and oxycodone 10 mg 3 to 4 tablets a day. She was initiated on olanzapine plus samidorphan tablet by her psychiatrist and took the first dose on day of admission. After that, she was supposed to put her fentanyl  patch on, and she went into severe opiates withdrawal within an hour with large diarrhea, abdominal cramps, and severe restlessness. Family brought her into the emergency department for evaluation. CBC showed white blood cell count of 20.4. Potassium 2.3, phosphorus 0.5, and magnesium 2.1. Calcium 11.1 and GFR is 53 which is below her baseline. EKG shows subtle changes of sinus syndrome and hypokalemia. She had a fentanyl patch placed in the emergency room, also was given IV lorazepam, potassium, and potassium phosphate compound, and she was admitted to the hospital for further management.   Psych was consulted for Severe opioid withdrawal secondary to Lybalvi.   Medications were adjusted and patient's mood improved.  She was successfully treated for her delirium episode with alternation in mood and cognition with episodes of  increased confusion, restlessness, agitation and response to internal stimuli in light of opiate withdrawal secondary to starting Lybalvi.   She did not want to be on Abilify due to prior weight gain. So instead Latuda was advised. Psych advised to avoid antidepressants. She was to continue her Lithium, Seroquel, and Klonopin. Medication reconciliation was done per psych. Patient to follow up with Dr. Patel in clinic. Patient in rush to discharge, left room prior to final paperwork being given. All medications sent electronically to pharmacy.       Complications: None    Consultants         Provider   Role Specialty     Genaro Patel MD      Consulting Physician Psychiatry                Discharge Plan:   Discharge Condition: Stable    Discharge Medication List as of 5/9/2025 12:30 PM        New Orders    Details   QUEtiapine 50 MG Oral Tab Take 1 tablet (50 mg total) by mouth nightly., Normal, Disp-30 tablet, R-0      lurasidone 20 MG Oral Tab Take 1 tablet (20 mg total) by mouth daily with dinner., Normal, Disp-30 tablet, R-0           Home Meds - Modified    Details   furosemide 40 MG  Oral Tab Take 1 tablet (40 mg total) by mouth as needed., Historical      clonazePAM 1 MG Oral Tab Take 1 tablet (1 mg total) by mouth 2 (two) times daily., Historical      lithium carbonate 300 MG Oral Cap Take 1 capsule (300 mg total) by mouth 2 (two) times daily with meals., Normal, Disp-60 capsule, R-0           Home Meds - Unchanged    Details   morphINE ER 30 MG Oral Tab CR Take 1 tablet (30 mg total) by mouth 3 (three) times daily., Historical      oxyCODONE 10 MG Oral Tab Take 1 tablet (10 mg total) by mouth 4 (four) times daily as needed for Pain., Historical      pravastatin 40 MG Oral Tab Take 1 tablet (40 mg total) by mouth nightly., Historical      SUMAtriptan Succinate 100 MG Oral Tab Take 1 tablet (100 mg total) by mouth daily as needed., Historical      rOPINIRole HCl 0.25 MG Oral Tab Take 1 tablet (0.25 mg total) by mouth nightly., Historical      fentaNYL (DURAGESIC) 100 MCG/HR Transdermal Patch 72 Hr Place 1 patch onto the skin every other day., Historical, R-0      Cyclobenzaprine HCl (CYCLOBENZAPRINE) 10 MG Oral Tab Take 1 tablet (10 mg total) by mouth nightly as needed., Historical, R-0                 Discharge Diet: As tolerated    Discharge Activity: As tolerated    Follow up:      Follow-up Information       Anay Gould DO. Go in 2 day(s).    Specialty: Internal Medicine  Why: For hospital discharge follow up  Contact information:  [address] [place] [address] [address]  [address] [address] [phone]                                   Other Discharge Instructions:         Depression and Anxiety   There is often a link between how someone is feeling physically and their emotional wellbeing. It isn’t unusual to experience depression or anxiety after a physical illness, major surgery or traumatic situation. It also isn’t unusual for someone dealing with anxiety or depression to develop other physical symptoms. The following tables will give you some comparisons between the physical and  emotional symptoms of depression and anxiety.  Depression  Mood disorders like depression are quite common. It is not unusual for someone who is depressed to experience both physical and emotional symptoms. A primary care physician may help you understand if your physical symptoms are related to a recent physical illness, stress, emotional turmoil, or an unexpected trauma.  Physical Symptoms:   * Changes in appetite Changes in sleep patterns,Fatigue, Persistent unexplained aches and pains, Headaches, Chest pain, Digestive problems  Behavioral Symptoms:   * Sadness, Increased irritability, Easily frustrated, Low self-esteem, Loss of interest in, pleasurable activities, Poor concentration, Suicidal thoughts  Anxiety  Stress and anxiety go hand in hand. From generalized anxiety to phobic disorders and panic attacks, these behavioral issues can cause both physical and emotional symptoms. Your feelings of anxiety could be caused by a recent physical illness, stress, emotional distress or feelings associated with an unexpected trauma. As with other mental illnesses, especially anxiety disorders, allow your physician to determine the cause of your physical health symptoms.  Physical Symptoms:   * Trouble falling or staying asleep, Heart palpitations, Trembling, Irritability, Sweating/flushing, Frequent urination or diarrhea, Being easily startled  Behavioral Symptoms:   * Persistent state of apprehension or fear, Feelings of dread without valid cause, Irritability or edginess, Intense/sudden feelings of panic or doom,    Feelings of detachment and unreality, Catastrophic thinking, Hyper-vigilance towards signs of danger  We’re here to help  Once the cause of your physical symptoms has been determined, your physician may recommend you see a psychiatrist, psychologist or a counselor for additional support. [facility] offers a free and confidential behavioral health assessment and specialized services at our locations in  [place], [place], [place], [place] and [place].  For more information, call the [facility] Help Line, available , at [phone] or visit   [email or web address].    Behavioral Health:    You were seen by Psychiatry while in the hospital. At this time, it is recommended that you continue your care with outpatient mental health services. Below are referrals for psychiatry and counseling. Please contact the providers listed to schedule your initial appointment and ensure continuity of care.     [facility] - multiple locations   Phone: [phone]  *VIRTUAL CARE OPTIONS AVAILABLE     [facility] [address] [place], [place], [address]  Phone: [phone]    [facility]  [address] [place] [address], [address] [address]  Phone [phone]    [facility]  [address] [facility], [place], [place] [address]  Phone [phone]    [facility]   [address] [place], [place], [address]  Phone: [phone]    [facility]  [address] [facility]  [place], [place] [address]  [phone]    Eileen Couch MD  [phone]  [address]  [place], [place] [address]    Stephon Meek MD  [address] [facility]  [place], [place] [address]  [phone]    Hieu Fay NP   [facility]   [phone] [address] [place], [place] [address]    Veena Carrizales NP  [facility]   [phone] [address] [place], [place] [address]    If you are experiencing a mental health crisis, please call 911 or go to your nearest ED. If you are not in immediate danger but require emotional support and/or assistance, please contact: [facility] at [phone] or [facility] at [phone].          >30 minutes spent on discharge orders, coordination, and planning.     Rosina  MD Facundo  5/9/2025

## 2025-05-13 NOTE — PAYOR COMM NOTE
--------------  DISCHARGE REVIEW    Payor: [employer] ADV PPO  Subscriber #:  [id number]  Authorization Number: [id number]    Admit date: 25  Admit time:   2:47 PM  Discharge Date: 2025 12:30 PM     Admitting Physician: Tomasa Barton MD  Attending Physician:  No att. providers found  Primary Care Physician: Anay Gould DO          Discharge Summary Notes        Discharge Summary signed by Rosina Loredo MD at 2025  9:32 PM       Author: Rosina Loredo MD Specialty: HOSPITALIST, Family Medicine Author Type: Physician    Filed: 2025  9:32 PM Date of Service: 2025  9:22 PM Status: Signed    : Rosina Loredo MD (Physician)         [facility]  part [facility]    Discharge Summary    Izabel Martin Patient Status:  Inpatient    1970 MRN [id number]   Location [facility] 5SW/SE Attending No att. providers found   Hosp Day # 3 PCP Anay Gould DO     Date of Admission: 2025   Date of Discharge: 2025 12:30 PM    Admitting Diagnosis: Hypokalemia [E87.6]  Tremor [R25.1]  Hypophosphatemia [E83.39]  Chronic, continuous use of opioids [F11.90]  Adverse effect of drug, initial encounter [T50.905A]  Intractable nausea [R11.0]    Disposition: Home    Discharge Diagnosis: .Principal Problem:    Intractable nausea  Active Problems:    Adverse effect of drug, initial encounter    Chronic, continuous use of opioids    Tremor    Hypokalemia    Hypophosphatemia    Opiate withdrawal (HCC)    Bipolar affective disorder, mixed, severe, with psychotic behavior (HCC)      Hospital Course:   Reason for Admission: Delirium episode with alternation in mood and cognition with episodes of  increased confusion, restlessness, agitation and response to internal stimuli in light of opiate withdrawal secondary to starting Lybalvi      Discharge Physical Exam:   General: No acute distress. Alert ,         Respiratory: Clear to auscultation bilaterally. No  wheezes. No rhonchi.  Cardiovascular: S1, S2. Regular rate and rhythm. No murmurs, rubs or gallops.   Abdomen: Soft, nontender, nondistended.  Positive bowel sounds. No rebound or guarding.  Neurologic: No focal neurological deficits.   Musculoskeletal: Moves all extremities.  Extremities: No edema.      Hospital Course:   The patient is a 54-year-old  female with chronic pain syndrome and fibromyalgia, usually on fentanyl patch 100 mcg every third day and oxycodone 10 mg 3 to 4 tablets a day. She was initiated on olanzapine plus samidorphan tablet by her psychiatrist and took the first dose on day of admission. After that, she was supposed to put her fentanyl patch on, and she went into severe opiates withdrawal within an hour with large diarrhea, abdominal cramps, and severe restlessness. Family brought her into the emergency department for evaluation. CBC showed white blood cell count of 20.4. Potassium 2.3, phosphorus 0.5, and magnesium 2.1. Calcium 11.1 and GFR is 53 which is below her baseline. EKG shows subtle changes of sinus syndrome and hypokalemia. She had a fentanyl patch placed in the emergency room, also was given IV lorazepam, potassium, and potassium phosphate compound, and she was admitted to the hospital for further management.   Psych was consulted for Severe opioid withdrawal secondary to Lybalvi.   Medications were adjusted and patient's mood improved.  She was successfully treated for her delirium episode with alternation in mood and cognition with episodes of  increased confusion, restlessness, agitation and response to internal stimuli in light of opiate withdrawal secondary to starting Lybalvi.   She did not want to be on Abilify due to prior weight gain. So instead Latuda was advised. Psych advised to avoid antidepressants. She was to continue her Lithium, Seroquel, and Klonopin. Medication reconciliation was done per psych. Patient to follow up with Dr. Patel in clinic. Patient  in rush to discharge, left room prior to final paperwork being given. All medications sent electronically to pharmacy.       Complications: None    Consultants         Provider   Role Specialty     Genaro Patel MD      Consulting Physician Psychiatry                Discharge Plan:   Discharge Condition: Stable    Discharge Medication List as of 5/9/2025 12:30 PM        New Orders    Details   QUEtiapine 50 MG Oral Tab Take 1 tablet (50 mg total) by mouth nightly., Normal, Disp-30 tablet, R-0      lurasidone 20 MG Oral Tab Take 1 tablet (20 mg total) by mouth daily with dinner., Normal, Disp-30 tablet, R-0           Home Meds - Modified    Details   furosemide 40 MG Oral Tab Take 1 tablet (40 mg total) by mouth as needed., Historical      clonazePAM 1 MG Oral Tab Take 1 tablet (1 mg total) by mouth 2 (two) times daily., Historical      lithium carbonate 300 MG Oral Cap Take 1 capsule (300 mg total) by mouth 2 (two) times daily with meals., Normal, Disp-60 capsule, R-0           Home Meds - Unchanged    Details   morphINE ER 30 MG Oral Tab CR Take 1 tablet (30 mg total) by mouth 3 (three) times daily., Historical      oxyCODONE 10 MG Oral Tab Take 1 tablet (10 mg total) by mouth 4 (four) times daily as needed for Pain., Historical      pravastatin 40 MG Oral Tab Take 1 tablet (40 mg total) by mouth nightly., Historical      SUMAtriptan Succinate 100 MG Oral Tab Take 1 tablet (100 mg total) by mouth daily as needed., Historical      rOPINIRole HCl 0.25 MG Oral Tab Take 1 tablet (0.25 mg total) by mouth nightly., Historical      fentaNYL (DURAGESIC) 100 MCG/HR Transdermal Patch 72 Hr Place 1 patch onto the skin every other day., Historical, R-0      Cyclobenzaprine HCl (CYCLOBENZAPRINE) 10 MG Oral Tab Take 1 tablet (10 mg total) by mouth nightly as needed., Historical, R-0                 Discharge Diet: As tolerated    Discharge Activity: As tolerated    Follow up:      Follow-up Information       Anay Gould,  DO. Go in 2 day(s).    Specialty: Internal Medicine  Why: For hospital discharge follow up  Contact information:  [address]  [facility] [address] [phone]                                   Other Discharge Instructions:         Depression and Anxiety   There is often a link between how someone is feeling physically and their emotional wellbeing. It isn’t unusual to experience depression or anxiety after a physical illness, major surgery or traumatic situation. It also isn’t unusual for someone dealing with anxiety or depression to develop other physical symptoms. The following tables will give you some comparisons between the physical and emotional symptoms of depression and anxiety.  Depression  Mood disorders like depression are quite common. It is not unusual for someone who is depressed to experience both physical and emotional symptoms. A primary care physician may help you understand if your physical symptoms are related to a recent physical illness, stress, emotional turmoil, or an unexpected trauma.  Physical Symptoms:   * Changes in appetite Changes in sleep patterns,Fatigue, Persistent unexplained aches and pains, Headaches, Chest pain, Digestive problems  Behavioral Symptoms:   * Sadness, Increased irritability, Easily frustrated, Low self-esteem, Loss of interest in, pleasurable activities, Poor concentration, Suicidal thoughts  Anxiety  Stress and anxiety go hand in hand. From generalized anxiety to phobic disorders and panic attacks, these behavioral issues can cause both physical and emotional symptoms. Your feelings of anxiety could be caused by a recent physical illness, stress, emotional distress or feelings associated with an unexpected trauma. As with other mental illnesses, especially anxiety disorders, allow your physician to determine the cause of your physical health symptoms.  Physical Symptoms:   * Trouble falling or staying asleep, Heart palpitations, Trembling,  Irritability, Sweating/flushing, Frequent urination or diarrhea, Being easily startled  Behavioral Symptoms:   * Persistent state of apprehension or fear, Feelings of dread without valid cause, Irritability or edginess, Intense/sudden feelings of panic or doom,    Feelings of detachment and unreality, Catastrophic thinking, Hyper-vigilance towards signs of danger  We’re here to help  Once the cause of your physical symptoms has been determined, your physician may recommend you see a psychiatrist, psychologist or a counselor for additional support. [facility] offers a free and confidential behavioral health assessment and specialized services at our locations in [facility] and [place].  For more information, call the [facility] Help Line, available , at [phone] or visit   [email or web address].    Behavioral Health:    You were seen by Psychiatry while in the hospital. At this time, it is recommended that you continue your care with outpatient mental health services. Below are referrals for psychiatry and counseling. Please contact the providers listed to schedule your initial appointment and ensure continuity of care.     [facility] - multiple locations   Phone: [phone]  *VIRTUAL CARE OPTIONS AVAILABLE     [facility] [address] [place], [place], [address]  Phone: [phone]    [facility] [employer]  [address] [facility] [address]  Phone [phone]    [facility]  [address] [facility], [place] [address]  Phone [phone]    [facility]   [address] [place], [place], [address]  Phone: [phone]    [facility]  [address] [facility]  [place], [place] [address]  [phone]    Eileen Couch MD  [phone]  [address]  [place], [place] [address]    Stephon Meek  MD  [address] [facility] [address] [facility] [address]  [place], [place] [address]  [phone]    Hieu Fay NP   [facility]   [phone] [address] [place], [place] [address]    Veena Carrizales NP  [facility]   [phone] [address] [place], [place] [address]    If you are experiencing a mental health crisis, please call 911 or go to your nearest ED. If you are not in immediate danger but require emotional support and/or assistance, please contact: [facility] at [phone] or [facility] at [phone].          >30 minutes spent on discharge orders, coordination, and planning.     Rosina Loredo MD  5/9/2025    Electronically signed by Rosina Loredo MD on 5/9/2025  9:32 PM         REVIEWER COMMENTS

## (undated) DEVICE — SET TUBI Y FL CNTRL INFL/OTFL

## (undated) DEVICE — SOL  .9 3000ML

## (undated) DEVICE — HYSTEROSCOPY: Brand: MEDLINE INDUSTRIES, INC.

## (undated) DEVICE — MYOSURE LITE BLADE

## (undated) DEVICE — SOL  .9 1000ML BTL

## (undated) DEVICE — ENCORE® LATEX MICRO SIZE 7, STERILE LATEX POWDER-FREE SURGICAL GLOVE: Brand: ENCORE

## (undated) DEVICE — SOCK CNSTR 4IN TNPSL UNV SPEC

## (undated) DEVICE — ENCORE® LATEX ACCLAIM SIZE 6.5, STERILE LATEX POWDER-FREE SURGICAL GLOVE: Brand: ENCORE

## (undated) DEVICE — STERILE SURGICAL LUBRICANT, METAL TUBE: Brand: SURGILUBE

## (undated) DEVICE — 1016 S-DRAPE IRRIG POUCH 10/BOX: Brand: STERI-DRAPE™

## (undated) NOTE — LETTER
MINOR CASE LETTER      1/26/2021        Dear Mortimer Manners,    Your are having a Dilation and Curettage, Hysteroscopy, Myosure on Thursday,02/11/2021 at 1:00pm at [facility].    Do not eat or drink anything (including water) after midnight the nig Call our office now to schedule your post-operative appointment for 2 weeks after surgery. Please feel free to contact our office at [phone] if you have any questions regarding these instructions or your procedure.       Sincerely,          Ina Yao

## (undated) NOTE — MR AVS SNAPSHOT
After Visit Summary   10/8/2020    Jeremiah Hernandez    MRN: [id number]           Visit Information     Date & Time  10/8/2020  3:40 PM Provider  Ernie Schofield, [address] [place] [address], [address] [place] [address],[address], [facility]  Phon Around October 8, 2020   Imaging:   [facility] AMALIA 2D+3D SCREENING BILAT (HLG=[id number])    Instructions: Your order will generate a \"Scheduling Ticket\" that will be available in [employer] to schedule on your own at a time most convenient to you.   Please note 2 ½ hours per week – spread out over time Use a ofelia to keep you motivated   Don’t forget strength training with weights and resistance Set goals and track your progress   You don’t need to join a gym. Home exercises work great.  Put more priority on exe or injury that are   non-life-threatening.   Also available by appointment     Average cost  $70*       [address]   Monday – Friday  10:00 am – 10:00 pm   Saturday – Sunday  1

## (undated) NOTE — ED AVS SNAPSHOT
Ajay Muñoz   MRN: [id number]    Department:  [facility] Emergency Department   Date of Visit:  8/23/2017           Disclosure     Insurance plans vary and the physician(s) referred by the ER may not be covered by your plan.  Please contac CARE PHYSICIAN AT ONCE OR RETURN IMMEDIATELY TO THE EMERGENCY DEPARTMENT. If you have been prescribed any medication(s), please fill your prescription right away and begin taking the medication(s) as directed.   If you believe that any of the medications